# Patient Record
Sex: FEMALE | Race: WHITE | Employment: OTHER | URBAN - NONMETROPOLITAN AREA
[De-identification: names, ages, dates, MRNs, and addresses within clinical notes are randomized per-mention and may not be internally consistent; named-entity substitution may affect disease eponyms.]

---

## 2021-08-15 ENCOUNTER — OFFICE VISIT (OUTPATIENT)
Dept: PRIMARY CARE CLINIC | Age: 76
End: 2021-08-15
Payer: MEDICARE

## 2021-08-15 VITALS
OXYGEN SATURATION: 98 % | WEIGHT: 183 LBS | TEMPERATURE: 97 F | HEART RATE: 77 BPM | HEIGHT: 60 IN | BODY MASS INDEX: 35.93 KG/M2

## 2021-08-15 DIAGNOSIS — T63.461A WASP STING, ACCIDENTAL OR UNINTENTIONAL, INITIAL ENCOUNTER: Primary | ICD-10-CM

## 2021-08-15 PROCEDURE — 4040F PNEUMOC VAC/ADMIN/RCVD: CPT | Performed by: FAMILY MEDICINE

## 2021-08-15 PROCEDURE — G8427 DOCREV CUR MEDS BY ELIG CLIN: HCPCS | Performed by: FAMILY MEDICINE

## 2021-08-15 PROCEDURE — 99213 OFFICE O/P EST LOW 20 MIN: CPT | Performed by: FAMILY MEDICINE

## 2021-08-15 PROCEDURE — 1036F TOBACCO NON-USER: CPT | Performed by: FAMILY MEDICINE

## 2021-08-15 PROCEDURE — 1123F ACP DISCUSS/DSCN MKR DOCD: CPT | Performed by: FAMILY MEDICINE

## 2021-08-15 PROCEDURE — 99213 OFFICE O/P EST LOW 20 MIN: CPT

## 2021-08-15 PROCEDURE — G8400 PT W/DXA NO RESULTS DOC: HCPCS | Performed by: FAMILY MEDICINE

## 2021-08-15 PROCEDURE — 1090F PRES/ABSN URINE INCON ASSESS: CPT | Performed by: FAMILY MEDICINE

## 2021-08-15 PROCEDURE — G8417 CALC BMI ABV UP PARAM F/U: HCPCS | Performed by: FAMILY MEDICINE

## 2021-08-15 RX ORDER — PHENOL 1.4 %
1 AEROSOL, SPRAY (ML) MUCOUS MEMBRANE DAILY
COMMUNITY

## 2021-08-15 RX ORDER — NITROGLYCERIN 0.4 MG/1
0.4 TABLET SUBLINGUAL EVERY 5 MIN PRN
COMMUNITY

## 2021-08-15 RX ORDER — ATORVASTATIN CALCIUM 40 MG/1
40 TABLET, FILM COATED ORAL DAILY
COMMUNITY

## 2021-08-15 RX ORDER — AMITRIPTYLINE HYDROCHLORIDE 10 MG/1
10 TABLET, FILM COATED ORAL NIGHTLY
COMMUNITY

## 2021-08-15 RX ORDER — ACETAMINOPHEN 325 MG/1
1300 TABLET ORAL EVERY 6 HOURS PRN
COMMUNITY

## 2021-08-15 RX ORDER — GABAPENTIN 300 MG/1
300 CAPSULE ORAL 3 TIMES DAILY
COMMUNITY

## 2021-08-15 RX ORDER — FAMOTIDINE 20 MG/1
20 TABLET, FILM COATED ORAL 2 TIMES DAILY
COMMUNITY

## 2021-08-15 RX ORDER — TRAMADOL HYDROCHLORIDE 50 MG/1
50 TABLET ORAL EVERY 6 HOURS PRN
COMMUNITY

## 2021-08-15 RX ORDER — HYDROCODONE BITARTRATE AND ACETAMINOPHEN 5; 325 MG/1; MG/1
1 TABLET ORAL EVERY 6 HOURS PRN
COMMUNITY

## 2021-08-15 SDOH — ECONOMIC STABILITY: FOOD INSECURITY: WITHIN THE PAST 12 MONTHS, THE FOOD YOU BOUGHT JUST DIDN'T LAST AND YOU DIDN'T HAVE MONEY TO GET MORE.: NEVER TRUE

## 2021-08-15 SDOH — ECONOMIC STABILITY: FOOD INSECURITY: WITHIN THE PAST 12 MONTHS, YOU WORRIED THAT YOUR FOOD WOULD RUN OUT BEFORE YOU GOT MONEY TO BUY MORE.: NEVER TRUE

## 2021-08-15 ASSESSMENT — ENCOUNTER SYMPTOMS
GASTROINTESTINAL NEGATIVE: 1
ALLERGIC/IMMUNOLOGIC NEGATIVE: 1
COLOR CHANGE: 1
EYES NEGATIVE: 1
RESPIRATORY NEGATIVE: 1

## 2021-08-15 ASSESSMENT — PATIENT HEALTH QUESTIONNAIRE - PHQ9
SUM OF ALL RESPONSES TO PHQ QUESTIONS 1-9: 0
SUM OF ALL RESPONSES TO PHQ9 QUESTIONS 1 & 2: 0
2. FEELING DOWN, DEPRESSED OR HOPELESS: 0
SUM OF ALL RESPONSES TO PHQ QUESTIONS 1-9: 0
SUM OF ALL RESPONSES TO PHQ QUESTIONS 1-9: 0
1. LITTLE INTEREST OR PLEASURE IN DOING THINGS: 0

## 2021-08-15 ASSESSMENT — SOCIAL DETERMINANTS OF HEALTH (SDOH): HOW HARD IS IT FOR YOU TO PAY FOR THE VERY BASICS LIKE FOOD, HOUSING, MEDICAL CARE, AND HEATING?: NOT HARD AT ALL

## 2021-08-15 NOTE — PROGRESS NOTES
Subjective:      Patient ID: Shawn Alfonso is a 64 y.o. female. HPI Acute urgent care visit for wasp sting on her right wrist Thursday while moving a flower pot. Swelling and redness migrating up the arm. Using benadryl. She feels she is slightly sensitive to wasp stings. History reviewed. No pertinent past medical history. Htn, oa, osteoprorosis. Hyperlipidemia, gerd, vit d deficiency. By review with patient today. History reviewed. No pertinent surgical history. Current Outpatient Medications   Medication Sig Dispense Refill    calcium carbonate 600 MG TABS tablet Take 1 tablet by mouth daily      acetaminophen (TYLENOL) 325 MG tablet Take 1,300 mg by mouth every 6 hours as needed for Pain      famotidine (PEPCID) 20 MG tablet Take 20 mg by mouth 2 times daily      nitroGLYCERIN (NITROSTAT) 0.4 MG SL tablet Place 0.4 mg under the tongue every 5 minutes as needed for Chest pain up to max of 3 total doses. If no relief after 1 dose, call 911.  atorvastatin (LIPITOR) 40 MG tablet Take 40 mg by mouth daily      HYDROcodone-acetaminophen (NORCO) 5-325 MG per tablet Take 1 tablet by mouth every 6 hours as needed for Pain.  vitamin D (CHOLECALCIFEROL) 25 MCG (1000 UT) TABS tablet Take 1,000 Units by mouth daily      amitriptyline (ELAVIL) 10 MG tablet Take 10 mg by mouth nightly      traMADol (ULTRAM) 50 MG tablet Take 50 mg by mouth every 6 hours as needed for Pain.  gabapentin (NEURONTIN) 100 MG capsule Take 200 mg by mouth 3 times daily. No current facility-administered medications for this visit. Allergies   Allergen Reactions    Codeine Nausea And Vomiting         Review of Systems   Constitutional: Negative. HENT: Negative. Eyes: Negative. Respiratory: Negative. Cardiovascular: Negative. Gastrointestinal: Negative. Endocrine: Negative. Genitourinary: Negative. Musculoskeletal: Negative.     Skin: Positive for color change, rash and

## 2021-10-08 ENCOUNTER — HOSPITAL ENCOUNTER (OUTPATIENT)
Age: 76
Setting detail: SPECIMEN
Discharge: HOME | End: 2021-10-08
Payer: MEDICARE

## 2021-10-08 ENCOUNTER — OFFICE VISIT (OUTPATIENT)
Dept: PRIMARY CARE CLINIC | Age: 76
End: 2021-10-08
Payer: MEDICARE

## 2021-10-08 ENCOUNTER — HOSPITAL ENCOUNTER (OUTPATIENT)
Dept: GENERAL RADIOLOGY | Age: 76
Discharge: HOME | End: 2021-10-10
Payer: MEDICARE

## 2021-10-08 VITALS
WEIGHT: 178 LBS | HEART RATE: 86 BPM | DIASTOLIC BLOOD PRESSURE: 88 MMHG | TEMPERATURE: 97.6 F | HEIGHT: 60 IN | BODY MASS INDEX: 34.95 KG/M2 | SYSTOLIC BLOOD PRESSURE: 118 MMHG | OXYGEN SATURATION: 98 %

## 2021-10-08 DIAGNOSIS — R06.02 SOB (SHORTNESS OF BREATH): ICD-10-CM

## 2021-10-08 DIAGNOSIS — R05.9 COUGH: ICD-10-CM

## 2021-10-08 DIAGNOSIS — R06.2 WHEEZING: ICD-10-CM

## 2021-10-08 DIAGNOSIS — J40 BRONCHITIS: ICD-10-CM

## 2021-10-08 DIAGNOSIS — J44.1 COPD EXACERBATION (HCC): Primary | ICD-10-CM

## 2021-10-08 LAB
SARS-COV-2, RAPID: NOT DETECTED
SPECIMEN DESCRIPTION: NORMAL

## 2021-10-08 PROCEDURE — G8417 CALC BMI ABV UP PARAM F/U: HCPCS | Performed by: NURSE PRACTITIONER

## 2021-10-08 PROCEDURE — 1090F PRES/ABSN URINE INCON ASSESS: CPT | Performed by: NURSE PRACTITIONER

## 2021-10-08 PROCEDURE — 99214 OFFICE O/P EST MOD 30 MIN: CPT | Performed by: NURSE PRACTITIONER

## 2021-10-08 PROCEDURE — G8484 FLU IMMUNIZE NO ADMIN: HCPCS | Performed by: NURSE PRACTITIONER

## 2021-10-08 PROCEDURE — G8400 PT W/DXA NO RESULTS DOC: HCPCS | Performed by: NURSE PRACTITIONER

## 2021-10-08 PROCEDURE — 87635 SARS-COV-2 COVID-19 AMP PRB: CPT

## 2021-10-08 PROCEDURE — G8427 DOCREV CUR MEDS BY ELIG CLIN: HCPCS | Performed by: NURSE PRACTITIONER

## 2021-10-08 PROCEDURE — 1123F ACP DISCUSS/DSCN MKR DOCD: CPT | Performed by: NURSE PRACTITIONER

## 2021-10-08 PROCEDURE — 71046 X-RAY EXAM CHEST 2 VIEWS: CPT

## 2021-10-08 PROCEDURE — 99212 OFFICE O/P EST SF 10 MIN: CPT | Performed by: NURSE PRACTITIONER

## 2021-10-08 PROCEDURE — 1036F TOBACCO NON-USER: CPT | Performed by: NURSE PRACTITIONER

## 2021-10-08 PROCEDURE — G8926 SPIRO NO PERF OR DOC: HCPCS | Performed by: NURSE PRACTITIONER

## 2021-10-08 PROCEDURE — 4040F PNEUMOC VAC/ADMIN/RCVD: CPT | Performed by: NURSE PRACTITIONER

## 2021-10-08 PROCEDURE — 3023F SPIROM DOC REV: CPT | Performed by: NURSE PRACTITIONER

## 2021-10-08 RX ORDER — BENZONATATE 100 MG/1
100 CAPSULE ORAL 3 TIMES DAILY PRN
Qty: 21 CAPSULE | Refills: 0 | Status: SHIPPED | OUTPATIENT
Start: 2021-10-08 | End: 2021-10-15

## 2021-10-08 RX ORDER — PREDNISONE 20 MG/1
20 TABLET ORAL 2 TIMES DAILY
Qty: 10 TABLET | Refills: 0 | Status: SHIPPED | OUTPATIENT
Start: 2021-10-08 | End: 2021-10-13

## 2021-10-08 RX ORDER — ALBUTEROL SULFATE 2.5 MG/3ML
SOLUTION RESPIRATORY (INHALATION)
COMMUNITY
Start: 2021-07-14

## 2021-10-08 RX ORDER — AZITHROMYCIN 250 MG/1
250 TABLET, FILM COATED ORAL SEE ADMIN INSTRUCTIONS
Qty: 6 TABLET | Refills: 0 | Status: SHIPPED | OUTPATIENT
Start: 2021-10-08 | End: 2021-10-13

## 2021-10-08 ASSESSMENT — ENCOUNTER SYMPTOMS
DIARRHEA: 1
NAUSEA: 0
SHORTNESS OF BREATH: 1
VOMITING: 0
COUGH: 1
WHEEZING: 1
SORE THROAT: 1

## 2021-10-08 ASSESSMENT — PATIENT HEALTH QUESTIONNAIRE - PHQ9
SUM OF ALL RESPONSES TO PHQ QUESTIONS 1-9: 0
SUM OF ALL RESPONSES TO PHQ QUESTIONS 1-9: 0
2. FEELING DOWN, DEPRESSED OR HOPELESS: 0
SUM OF ALL RESPONSES TO PHQ QUESTIONS 1-9: 0
SUM OF ALL RESPONSES TO PHQ9 QUESTIONS 1 & 2: 0
1. LITTLE INTEREST OR PLEASURE IN DOING THINGS: 0

## 2021-10-08 NOTE — PATIENT INSTRUCTIONS
Rapid covid negative today in office. Chest xray does not show any acute concerns today. Will start you on zapk and course of steroids for COPD exacerbation. Take steroids early in the day to avoid sleep interruptions. Will also send in tessalon for your cough; you may take this every 8 hours as needed. Continue to increase your fluids. Can use mucinex as needed. Tylenol as needed. Use your nebulizer every 4-6 hours as needed for wheezing. If symptoms worsen, please return to clinic. Patient Education        Chronic Obstructive Pulmonary Disease (COPD) Flare-Ups: Care Instructions  Overview     Chronic obstructive pulmonary disease (COPD) is a lung disease that makes it hard to breathe. It is caused by damage to the lungs over many years, usually from smoking. Chronic bronchitis and emphysema are two lung problems that are types of COPD:  · Chronic bronchitis: The airways that carry air to the lungs (bronchial tubes) get inflamed and make a lot of mucus. This can narrow or block the airways. It can also make you cough. · Emphysema: The tiny air sacs (alveoli) at the end of the airways in the lungs are damaged. When the air sacs are damaged or destroyed, the inner walls break down, and the sacs become larger. These larger air sacs move less oxygen into the blood. This causes difficulty breathing or shortness of breath that gets worse over time. After air sacs are destroyed, they cannot be replaced. Many people with COPD have attacks called flare-ups or exacerbations. This is when your usual symptoms quickly get worse and stay worse. If you notice any problems or new symptoms, get medical treatment right away. Follow-up care is a key part of your treatment and safety. Be sure to make and go to all appointments, and call your doctor if you are having problems. It's also a good idea to know your test results and keep a list of the medicines you take. How can you care for yourself at home?   · Be safe with medicines. Take your medicines exactly as prescribed. Call your doctor if you think you are having a problem with your medicine. You may be taking medicines such as:  ? Bronchodilators. These help open your airways and make breathing easier. ? Corticosteroids. These reduce airway inflammation. They may be given as pills, in a vein, or in an inhaled form. You may go home with pills in addition to an inhaler that you already use. · A spacer may help you get more inhaled medicine to your lungs. Ask your doctor or pharmacist if a spacer is right for you. If it is, ask how to use it properly. · If your doctor prescribed antibiotics, take them as directed. Do not stop taking them just because you feel better. You need to take the full course of antibiotics. · If your doctor prescribed oxygen, use the flow rate your doctor has recommended. Do not change it without talking to your doctor first.  · Do not smoke. Smoking makes COPD worse. If you need help quitting, talk to your doctor about stop-smoking programs and medicines. These can increase your chances of quitting for good. When should you call for help? Call 911 anytime you think you may need emergency care. For example, call if:    · You have severe trouble breathing. Call your doctor now or seek immediate medical care if:    · You have new or worse trouble breathing.     · Your coughing or wheezing gets worse.     · You cough up dark brown or bloody mucus (sputum).     · You have a new or higher fever.     · You have severe chest pain, or chest pain is quickly getting worse. Watch closely for changes in your health, and be sure to contact your doctor if:    · You notice more mucus or a change in the color of your mucus.     · You need to use your antibiotic or steroid pills.     · You do not get better as expected. Where can you learn more? Go to [email or web address] org and sign in to your [employer] account.  Enter R620 in the Search Health Information box to learn more about \"Chronic Obstructive Pulmonary Disease (COPD) Flare-Ups: Care Instructions. \"     If you do not have an account, please click on the \"Sign Up Now\" link. Current as of: July 6, 2021               Content Version: 13.0  © 8619-4940 Healthwise, Inc[employer]. Care instructions adapted under license by [facility] ([facility]). If you have questions about a medical condition or this instruction, always ask your healthcare professional. [address] any warranty or liability for your use of this information.

## 2021-10-08 NOTE — PROGRESS NOTES
[address] [place] [address]  Dept: [phone]  Dept Fax: [phone]: [phone]        CHIEF COMPLAINT       Chief Complaint   Patient presents with    Cough     sx began monday and has progressively gotten worse. pt has productive cough,nasal drainage,weak pt has tried otc remedies without success. pt has to be careful of medication she takes due to hx of c-diff. Nurses Notes reviewed and I agree except as noted in the HPI. HISTORY OF PRESENT ILLNESS   Geovany Frost is a 64 y.o. female who presents to [facility] today (10/8/2021) for evaluation of:   Cough  This is a new problem. The current episode started in the past 7 days (10/4/21). The problem has been gradually worsening. The problem occurs every few minutes. The cough is productive of sputum. Associated symptoms include a sore throat (Monday, has improved), shortness of breath (with activity) and wheezing (worse at night). Pertinent negatives include no chills, fever, headaches or myalgias. Associated symptoms comments: Pulse ox running 95-98% at home. She has tried OTC cough suppressant (mucinex, nyquil, dayquil; using albuterol neb) for the symptoms. The treatment provided mild relief. Her past medical history is significant for COPD. Adult grandson had sinus cold, but pt states was not around much. Pt is vaccinated against covid. Pt has not received flu vaccine. REVIEW OF SYSTEMS     Review of Systems   Constitutional: Positive for fatigue. Negative for chills and fever. HENT: Positive for congestion (slight) and sore throat (Monday, has improved). Respiratory: Positive for cough, shortness of breath (with activity) and wheezing (worse at night). Gastrointestinal: Positive for diarrhea (yesterday). Negative for nausea and vomiting. Musculoskeletal: Negative for myalgias. Neurological: Positive for weakness. Negative for headaches. PAST MEDICAL HISTORY   History reviewed. No pertinent past medical history. SURGICAL HISTORY     Patient  has no past surgical history on file. CURRENT MEDICATIONS       Outpatient Medications Prior to Visit   Medication Sig Dispense Refill    calcium carbonate 600 MG TABS tablet Take 1 tablet by mouth daily      acetaminophen (TYLENOL) 325 MG tablet Take 1,300 mg by mouth every 6 hours as needed for Pain      famotidine (PEPCID) 20 MG tablet Take 20 mg by mouth 2 times daily      nitroGLYCERIN (NITROSTAT) 0.4 MG SL tablet Place 0.4 mg under the tongue every 5 minutes as needed for Chest pain up to max of 3 total doses. If no relief after 1 dose, call 911.  atorvastatin (LIPITOR) 40 MG tablet Take 40 mg by mouth daily      HYDROcodone-acetaminophen (NORCO) 5-325 MG per tablet Take 1 tablet by mouth every 6 hours as needed for Pain.  vitamin D (CHOLECALCIFEROL) 25 MCG (1000 UT) TABS tablet Take 1,000 Units by mouth daily      amitriptyline (ELAVIL) 10 MG tablet Take 10 mg by mouth nightly      traMADol (ULTRAM) 50 MG tablet Take 50 mg by mouth every 6 hours as needed for Pain.  gabapentin (NEURONTIN) 100 MG capsule Take 200 mg by mouth 3 times daily.  albuterol (PROVENTIL) (2.5 MG/3ML) 0.083% nebulizer solution USE 1 VIAL IN NEBULIZER 4 TIMES DAILY AS NEEDED FOR SHORTNESS OF BREATH WHEEZING       No facility-administered medications prior to visit. ALLERGIES     Patient is is allergic to codeine. FAMILY HISTORY     Patient's family history is not on file. SOCIAL HISTORY     Patient  reports that she quit smoking about 13 years ago. She has a 20.00 pack-year smoking history. She has never used smokeless tobacco. She reports current alcohol use of about 1.0 standard drinks of alcohol per week. She reports that she does not use drugs.     PHYSICAL EXAM     VITALS  BP: 118/88, Temp: 97.6 °F (36.4 °C), Pulse: 86,  , SpO2: 98 %  Physical Exam  Vitals reviewed. Constitutional:       General: She is not in acute distress. Appearance: She is not ill-appearing. HENT:      Right Ear: Tympanic membrane and ear canal normal.      Left Ear: Tympanic membrane and ear canal normal.      Nose: Nose normal. No congestion or rhinorrhea. Mouth/Throat:      Mouth: Mucous membranes are moist.      Pharynx: Oropharynx is clear. No oropharyngeal exudate or posterior oropharyngeal erythema. Cardiovascular:      Rate and Rhythm: Normal rate and regular rhythm. Heart sounds: Normal heart sounds, S1 normal and S2 normal. No murmur heard. Pulmonary:      Effort: Pulmonary effort is normal. Prolonged expiration present. No accessory muscle usage, respiratory distress or retractions. Breath sounds: Wheezing present. No rhonchi. Musculoskeletal:      Cervical back: Normal range of motion and neck supple. Lymphadenopathy:      Cervical: No cervical adenopathy. Skin:     General: Skin is warm and dry. Capillary Refill: Capillary refill takes less than 2 seconds. Neurological:      General: No focal deficit present. Mental Status: She is alert. DIAGNOSTIC RESULTS   Labs:No results found for this visit on 10/08/21.  10/08/21 1147  COVID-19, Rapid   Collected: 10/08/21 1108  Final result  Specimen: Nasopharyngeal Swab    Specimen Description . NASOPHARYNGEAL SWAB SARS-CoV-2, Rapid Not Detected           IMAGING:  EXAMINATION:   TWO XRAY VIEWS OF THE CHEST       10/8/2021 11:21 am       COMPARISON:   None.       HISTORY:   ORDERING SYSTEM PROVIDED HISTORY: Cough   TECHNOLOGIST PROVIDED HISTORY:   Worsening cough, wheezing, SOB since Monday.  Fully vaccinated.  Hx of COPD   Reason for Exam: Cough x 1 week, patient has COPD   Acuity: Acute   Type of Exam: Initial       FINDINGS:   The heart is at the upper limits of normal in size with biventricular   configuration.  No evidence of pneumothorax, pleural effusion, infiltrate, or   abnormal lung mass.  There is flattening of the hemidiaphragms.  Spondylitic   changes are present in the thoracic spine.  Some calcifications present in   the aorta.  Surgical clips are present in the region of the gallbladder   fossa.  The central pulmonary vascularity appears normal.           Impression   Mild senescent changes compatible with the age of the patient.       No evidence of acute cardiopulmonary process. CLINICAL COURSE:     Vitals:    10/08/21 1025   BP: 118/88   Site: Left Upper Arm   Position: Sitting   Cuff Size: Medium Adult   Pulse: 86   Temp: 97.6 °F (36.4 °C)   TempSrc: Tympanic   SpO2: 98%   Weight: 178 lb (80.7 kg)   Height: 5' (1.524 m)           PROCEDURES:  None  FINAL IMPRESSION      1. COPD exacerbation (HCC)    2. Bronchitis    3. Cough    4. SOB (shortness of breath)    5. Wheezing         DISPOSITION/PLAN   Discussed possibility pf pt symptoms being covid and if positive, could received regeneron therapy. Pt agreed to regeneron if positive; rapid covid test completed in office, negative. CXR negative in office as well. Pt started on zpak and prednisone course for COPD exacerbation. Tessalon for cough as needed sent also. Pt to use nebulizer for wheezing eery 4-6 hours as needed. Discussed supportive measures for symptom relief such as increasing fluids, using mucinex, tylenol for pain. Encouraged pt and daughter to return to clinic should pt worsen or any concerns arise. Patient Instructions     Rapid covid negative today in office. Chest xray does not show any acute concerns today. Will start you on zapk and course of steroids for COPD exacerbation. Take steroids early in the day to avoid sleep interruptions. Will also send in tessalon for your cough; you may take this every 8 hours as needed. Continue to increase your fluids. Can use mucinex as needed. Tylenol as needed.   Use your nebulizer every 4-6 hours as needed for wheezing. If symptoms worsen, please return to clinic. Patient Education        Chronic Obstructive Pulmonary Disease (COPD) Flare-Ups: Care Instructions  Overview     Chronic obstructive pulmonary disease (COPD) is a lung disease that makes it hard to breathe. It is caused by damage to the lungs over many years, usually from smoking. Chronic bronchitis and emphysema are two lung problems that are types of COPD:  · Chronic bronchitis: The airways that carry air to the lungs (bronchial tubes) get inflamed and make a lot of mucus. This can narrow or block the airways. It can also make you cough. · Emphysema: The tiny air sacs (alveoli) at the end of the airways in the lungs are damaged. When the air sacs are damaged or destroyed, the inner walls break down, and the sacs become larger. These larger air sacs move less oxygen into the blood. This causes difficulty breathing or shortness of breath that gets worse over time. After air sacs are destroyed, they cannot be replaced. Many people with COPD have attacks called flare-ups or exacerbations. This is when your usual symptoms quickly get worse and stay worse. If you notice any problems or new symptoms, get medical treatment right away. Follow-up care is a key part of your treatment and safety. Be sure to make and go to all appointments, and call your doctor if you are having problems. It's also a good idea to know your test results and keep a list of the medicines you take. How can you care for yourself at home? · Be safe with medicines. Take your medicines exactly as prescribed. Call your doctor if you think you are having a problem with your medicine. You may be taking medicines such as:  ? Bronchodilators. These help open your airways and make breathing easier. ? Corticosteroids. These reduce airway inflammation. They may be given as pills, in a vein, or in an inhaled form.  You may go home with pills in addition to an inhaler that you already ask your healthcare professional. [place] [address] any warranty or liability for your use of this information. The use, risks, benefits, and potential side effects of prescribed and/or recommended medications were discussed. All questions were answered and the patient/caregiver voiced understanding. Orders Placed This Encounter   Procedures    XR CHEST STANDARD (2 VW)     Standing Status:   Future     Number of Occurrences:   1     Standing Expiration Date:   10/8/2022     Order Specific Question:   Reason for exam:     Answer: Worsening cough, wheezing, SOB since Monday. Fully vaccinated. Hx of COPD     Outpatient Encounter Medications as of 10/8/2021   Medication Sig Dispense Refill    predniSONE (DELTASONE) 20 MG tablet Take 1 tablet by mouth 2 times daily for 5 days 10 tablet 0    azithromycin (ZITHROMAX) 250 MG tablet Take 1 tablet by mouth See Admin Instructions for 5 days 500mg on day 1 followed by 250mg on days 2 - 5 6 tablet 0    benzonatate (TESSALON) 100 MG capsule Take 1 capsule by mouth 3 times daily as needed for Cough 21 capsule 0    calcium carbonate 600 MG TABS tablet Take 1 tablet by mouth daily      acetaminophen (TYLENOL) 325 MG tablet Take 1,300 mg by mouth every 6 hours as needed for Pain      famotidine (PEPCID) 20 MG tablet Take 20 mg by mouth 2 times daily      nitroGLYCERIN (NITROSTAT) 0.4 MG SL tablet Place 0.4 mg under the tongue every 5 minutes as needed for Chest pain up to max of 3 total doses. If no relief after 1 dose, call 911.  atorvastatin (LIPITOR) 40 MG tablet Take 40 mg by mouth daily      HYDROcodone-acetaminophen (NORCO) 5-325 MG per tablet Take 1 tablet by mouth every 6 hours as needed for Pain.       vitamin D (CHOLECALCIFEROL) 25 MCG (1000 UT) TABS tablet Take 1,000 Units by mouth daily      amitriptyline (ELAVIL) 10 MG tablet Take 10 mg by mouth nightly      traMADol (ULTRAM) 50 MG tablet Take 50 mg by mouth every 6 hours as needed for Pain.  gabapentin (NEURONTIN) 100 MG capsule Take 200 mg by mouth 3 times daily.  albuterol (PROVENTIL) (2.5 MG/3ML) 0.083% nebulizer solution USE 1 VIAL IN NEBULIZER 4 TIMES DAILY AS NEEDED FOR SHORTNESS OF BREATH WHEEZING       No facility-administered encounter medications on file as of 10/8/2021. No follow-ups on file.                 Electronically signed by DO Fagan CNP on 10/8/2021 at 8:03 PM

## 2022-06-30 ENCOUNTER — HOSPITAL ENCOUNTER (EMERGENCY)
Age: 77
Discharge: HOME | End: 2022-06-30
Attending: EMERGENCY MEDICINE
Payer: MEDICARE

## 2022-06-30 VITALS
SYSTOLIC BLOOD PRESSURE: 132 MMHG | BODY MASS INDEX: 35.3 KG/M2 | WEIGHT: 179.8 LBS | DIASTOLIC BLOOD PRESSURE: 78 MMHG | HEART RATE: 74 BPM | TEMPERATURE: 98.4 F | RESPIRATION RATE: 16 BRPM | OXYGEN SATURATION: 98 % | HEIGHT: 60 IN

## 2022-06-30 DIAGNOSIS — Z87.19 HISTORY OF ESOPHAGEAL STRICTURE: Primary | ICD-10-CM

## 2022-06-30 DIAGNOSIS — R13.19 ESOPHAGEAL DYSPHAGIA: ICD-10-CM

## 2022-06-30 PROCEDURE — 99282 EMERGENCY DEPT VISIT SF MDM: CPT

## 2022-06-30 RX ORDER — ASPIRIN 81 MG/1
81 TABLET, CHEWABLE ORAL DAILY
COMMUNITY

## 2022-06-30 RX ORDER — GABAPENTIN 400 MG/1
400 CAPSULE ORAL NIGHTLY
COMMUNITY

## 2022-06-30 RX ORDER — FOSINOPRIL SODIUM 40 MG/1
40 TABLET ORAL DAILY
COMMUNITY

## 2022-06-30 ASSESSMENT — ENCOUNTER SYMPTOMS
ABDOMINAL PAIN: 0
NAUSEA: 0
DIARRHEA: 0
SHORTNESS OF BREATH: 0
EYE PAIN: 0
BACK PAIN: 0
TROUBLE SWALLOWING: 1
VOMITING: 0

## 2022-06-30 ASSESSMENT — PAIN - FUNCTIONAL ASSESSMENT: PAIN_FUNCTIONAL_ASSESSMENT: 0-10

## 2022-06-30 ASSESSMENT — PAIN SCALES - GENERAL: PAINLEVEL_OUTOF10: 0

## 2022-06-30 NOTE — ED PROVIDER NOTES
[facility]  eMERGENCY dEPARTMENT eNCOUnter      Pt Name: Nancy Montgomery  MRN: [id number]  [place] 1945  Date of evaluation: 6/30/2022      CHIEF COMPLAINT       Chief Complaint   Patient presents with    Dysphagia     increased difficulties swallowing for the past 2 weeks. HISTORY OF PRESENT ILLNESS    Nancy Montgomery is a 76 y.o. female who presents with chief complaint of difficulty swallowing, its been there for last couple weeks she is able to swallow soft foods and liquids but she having problems with pills and larger food. She has had this happen a couple times in the past and she has had to have esophageal dilatation she has had no symptoms today as long as she is not 20 swallow food she does okay. Does not feel like there is any choking. She would like to be referred to a surgeon after 4 July to have this attended to  Patient is visiting her daughter she is coming from [place] and will be here through the end of September  There is been no fevers chills cough or shortness of breath  REVIEW OF SYSTEMS         Review of Systems   Constitutional: Negative for chills and fever. HENT: Positive for trouble swallowing. Negative for congestion and ear pain. Eyes: Negative for pain and visual disturbance. Respiratory: Negative for shortness of breath. Cardiovascular: Negative for chest pain, palpitations and leg swelling. Gastrointestinal: Negative for abdominal pain, diarrhea, nausea and vomiting. Endocrine: Negative for polydipsia and polyuria. Genitourinary: Negative for difficulty urinating, dysuria and frequency. Musculoskeletal: Negative for back pain, joint swelling, myalgias, neck pain and neck stiffness. Skin: Negative for rash. Neurological: Negative for dizziness, weakness and headaches. Hematological: Negative for adenopathy. Does not bruise/bleed easily. Psychiatric/Behavioral: Negative for confusion, self-injury and suicidal ideas.          PAST MEDICAL HISTORY    has a past medical history of COPD (chronic obstructive pulmonary disease) ([facility] [address] GERD (gastroesophageal reflux disease), and Hyperlipidemia. SURGICAL HISTORY      has a past surgical history that includes Esophagogastroduodenoscopy (02/03/2020). CURRENT MEDICATIONS       Discharge Medication List as of 6/30/2022  2:19 PM      CONTINUE these medications which have NOT CHANGED    Details   !! gabapentin (NEURONTIN) 400 MG capsule Take 400 mg by mouth at bedtime. Historical Med      aspirin 81 MG chewable tablet Take 81 mg by mouth dailyHistorical Med      fosinopril (MONOPRIL) 40 MG tablet Take 40 mg by mouth dailyHistorical Med      albuterol (PROVENTIL) (2.5 MG/3ML) 0.083% nebulizer solution USE 1 VIAL IN NEBULIZER 4 TIMES DAILY AS NEEDED FOR SHORTNESS OF BREATH WHEEZINGHistorical Med      calcium carbonate 600 MG TABS tablet Take 1 tablet by mouth dailyHistorical Med      acetaminophen (TYLENOL) 325 MG tablet Take 1,300 mg by mouth every 6 hours as needed for PainHistorical Med      famotidine (PEPCID) 20 MG tablet Take 20 mg by mouth 2 times dailyHistorical Med      nitroGLYCERIN (NITROSTAT) 0.4 MG SL tablet Place 0.4 mg under the tongue every 5 minutes as needed for Chest pain up to max of 3 total doses. If no relief after 1 dose, call 911. Historical Med      atorvastatin (LIPITOR) 40 MG tablet Take 40 mg by mouth dailyHistorical Med      HYDROcodone-acetaminophen (NORCO) 5-325 MG per tablet Take 1 tablet by mouth every 6 hours as needed for Pain. Historical Med      vitamin D (CHOLECALCIFEROL) 25 MCG (1000 UT) TABS tablet Take 1,000 Units by mouth dailyHistorical Med      amitriptyline (ELAVIL) 10 MG tablet Take 10 mg by mouth nightlyHistorical Med      traMADol (ULTRAM) 50 MG tablet Take 50 mg by mouth every 6 hours as needed for Pain. Historical Med      !! gabapentin (NEURONTIN) 100 MG capsule Take 300 mg by mouth 3 times daily.  Historical Med       !! - Potential duplicate medications found. Please discuss with provider. ALLERGIES     is allergic to codeine. FAMILY HISTORY     has no family status information on file. family history is not on file. SOCIAL HISTORY      reports that she quit smoking about 14 years ago. She has a 20.00 pack-year smoking history. She has never used smokeless tobacco. She reports current alcohol use of about 1.0 standard drink of alcohol per week. She reports that she does not use drugs. PHYSICAL EXAM     INITIAL VITALS:  height is 5' (1.524 m) and weight is 179 lb 12.8 oz (81.6 kg). Her temperature is 98.4 °F (36.9 °C). Her blood pressure is 132/78 and her pulse is 74. Her respiration is 16 and oxygen saturation is 98%. Physical Exam  Constitutional:       Appearance: She is well-developed. HENT:      Head: Normocephalic and atraumatic. Right Ear: External ear normal.      Left Ear: External ear normal.   Eyes:      Conjunctiva/sclera: Conjunctivae normal.      Pupils: Pupils are equal, round, and reactive to light. Cardiovascular:      Rate and Rhythm: Normal rate and regular rhythm. Pulmonary:      Effort: Pulmonary effort is normal.      Breath sounds: Normal breath sounds. Abdominal:      General: Bowel sounds are normal.      Palpations: Abdomen is soft. Musculoskeletal:         General: No tenderness. Cervical back: Normal range of motion. Skin:     General: Skin is warm and dry. Neurological:      General: No focal deficit present. Mental Status: She is alert and oriented to person, place, and time.    Psychiatric:         Behavior: Behavior normal.           DIFFERENTIAL DIAGNOSIS/ MDM:     Dysphagia chronic patient in no acute distress we will consult surgery    DIAGNOSTIC RESULTS     EKG: All EKG's are interpreted by the Emergency Department Physician who either signs or Co-signs this chart in the absence of a cardiologist.        RADIOLOGY:   I directly visualized the following  images and reviewed the radiologist interpretations:          ED BEDSIDE ULTRASOUND:       LABS:  Labs Reviewed - No data to display        EMERGENCY DEPARTMENT COURSE:   Vitals:    Vitals:    06/30/22 1345 06/30/22 1400 06/30/22 1415 06/30/22 1430   BP: 135/64 132/78     Pulse:       Resp:       Temp:       SpO2: 97% 92% 97% 98%   Weight:       Height:         -------------------------  BP: 132/78, Temp: 98.4 °F (36.9 °C), Heart Rate: 74, Resp: 16        Re-evaluation Notes        CRITICAL CARE:   None        CONSULTS: General surgery was consulted and will see her in outpatient basis to possibly planning esophageal dilatation      PROCEDURES:  None    FINAL IMPRESSION      1. History of esophageal stricture    2. Esophageal dysphagia          DISPOSITION/PLAN   DISPOSITION Decision To Discharge    Condition on Disposition    Stable    PATIENT REFERRED TO:  Tirso Márquez MD  [address] [place] [address]  [phone]            DISCHARGE MEDICATIONS:  Discharge Medication List as of 6/30/2022  2:19 PM          (Please note that portions of this note were completed with a voice recognition program.  Efforts were made to edit the dictations but occasionally words are mis-transcribed.)    Casey Guerrero MD,, MD, F.A.A.E.M.   Attending Emergency Physician                          Casey Guerrero MD  07/01/22 [id number]

## 2022-07-07 ENCOUNTER — INITIAL CONSULT (OUTPATIENT)
Dept: SURGERY | Age: 77
End: 2022-07-07
Payer: MEDICARE

## 2022-07-07 VITALS
TEMPERATURE: 97.6 F | DIASTOLIC BLOOD PRESSURE: 84 MMHG | BODY MASS INDEX: 33.53 KG/M2 | SYSTOLIC BLOOD PRESSURE: 130 MMHG | HEIGHT: 61 IN | WEIGHT: 177.6 LBS | HEART RATE: 78 BPM

## 2022-07-07 DIAGNOSIS — R13.19 ESOPHAGEAL DYSPHAGIA: Primary | ICD-10-CM

## 2022-07-07 DIAGNOSIS — R07.9 CHEST PAIN, UNSPECIFIED TYPE: ICD-10-CM

## 2022-07-07 PROCEDURE — 1036F TOBACCO NON-USER: CPT | Performed by: SURGERY

## 2022-07-07 PROCEDURE — 99215 OFFICE O/P EST HI 40 MIN: CPT

## 2022-07-07 PROCEDURE — 1123F ACP DISCUSS/DSCN MKR DOCD: CPT | Performed by: SURGERY

## 2022-07-07 PROCEDURE — G8400 PT W/DXA NO RESULTS DOC: HCPCS | Performed by: SURGERY

## 2022-07-07 PROCEDURE — 1090F PRES/ABSN URINE INCON ASSESS: CPT | Performed by: SURGERY

## 2022-07-07 PROCEDURE — G8427 DOCREV CUR MEDS BY ELIG CLIN: HCPCS | Performed by: SURGERY

## 2022-07-07 PROCEDURE — G8417 CALC BMI ABV UP PARAM F/U: HCPCS | Performed by: SURGERY

## 2022-07-07 PROCEDURE — 99203 OFFICE O/P NEW LOW 30 MIN: CPT | Performed by: SURGERY

## 2022-07-07 NOTE — ASSESSMENT & PLAN NOTE
Certainly based on her history of prior strictures and her current symptoms I think that she probably does have some esophageal stricture as contributing to her current symptoms. However this could also have some contribution from poor esophageal motility or other issues. I am going to start with esophagram just to get a better idea of the significance of this stricturing to see if there is any other pathology in the esophagus that may be contributing to her symptoms and to look at the motility of the esophagus. I will tentatively begin planning for EGD with dilation. Risks of the procedure including bleeding, infection, perforation, need for the surgery and anesthesia risk are discussed and consent is obtained. I have encouraged the patient to continue her Pepcid daily. We also discussed trying to avoid breads is much as possible and if she is going to eat any bread or meat that she needs to chew this very well before swallowing and that she should drink plenty of liquids with her meals as well.

## 2022-07-07 NOTE — PROGRESS NOTES
Javi Lambert is a 76 y.o. female who presents today for further evaluation of worsening dysphagia. The patient was recently seen in the ER for dysphagia and states that she is over the past few weeks begun to notice that when she eats foods that she will feel them getting stuck and also when she takes her medications she feels like they are getting stuck in her chest.  She does have a history of similar symptoms and at least on 2 occasions in the past with the most recent being in 2020 she has had EGD with dilation. This was done at outside facility in [place], she is where she lives and she is just here during the summer visiting family. She does report that she tries to avoid meats and breads due to the symptoms and does try to chew her food up well and drink liquids with foods but despite this over these past few weeks has begun to have recurrence of her dysphagia symptoms. On further questioning she does report that she occasionally will have emesis when things do not go down. Denies any hematemesis. Does not have much problems with liquids but states that even with liquids occasionally she will have mild symptoms if she drinks too quickly. Denies any significant heartburn symptoms but does take Pepcid and is taking over-the-counter antacids at least a couple times a month. No changes in bowel movements. No unintended weight loss. She is a former smoker but quit several years ago. No significant alcohol use. Drinks approximately 3 cups of coffee a day and also drinks Sprite sometimes when she gets chest pressure which causes her to belch and will relieve her symptoms. However, on further discussion she does report that she gets chest pain and pressure at least a couple times a month and it seems that she has been taking aspirin and nitroglycerin for this pain.   It is unclear when she was prescribed a nitroglycerin or for what reason but as we discussed this further she states that when she gets that sort of pain it will radiate up into her jaw bilaterally. She states that she thinks this is related to a hiatal hernia that she has been diagnosed within the past and that taking the nitroglycerin \"helps with my hiatal hernia\". She does see a cardiologist and typically sees her cardiologist every 6 months. She has had a prior stress test but seems that this was several years ago. Has never had any cardiac interventions that she mentions. It is unclear why she is following with a cardiologist based on her report of no cardiac issues. Because of her dysphagia and the concern for stricture she was referred to general surgery from the ER. Past Medical History:   Diagnosis Date    COPD (chronic obstructive pulmonary disease) ([facility] [address]     GERD (gastroesophageal reflux disease)     Hyperlipidemia        Past Surgical History:   Procedure Laterality Date    ESOPHAGOGASTRODUODENOSCOPY  02/03/2020       Current Outpatient Medications   Medication Sig Dispense Refill    gabapentin (NEURONTIN) 400 MG capsule Take 400 mg by mouth at bedtime.  aspirin 81 MG chewable tablet Take 81 mg by mouth daily      fosinopril (MONOPRIL) 40 MG tablet Take 40 mg by mouth daily      albuterol (PROVENTIL) (2.5 MG/3ML) 0.083% nebulizer solution USE 1 VIAL IN NEBULIZER 4 TIMES DAILY AS NEEDED FOR SHORTNESS OF BREATH WHEEZING      calcium carbonate 600 MG TABS tablet Take 1 tablet by mouth daily      acetaminophen (TYLENOL) 325 MG tablet Take 1,300 mg by mouth every 6 hours as needed for Pain      famotidine (PEPCID) 20 MG tablet Take 20 mg by mouth 2 times daily      nitroGLYCERIN (NITROSTAT) 0.4 MG SL tablet Place 0.4 mg under the tongue every 5 minutes as needed for Chest pain up to max of 3 total doses. If no relief after 1 dose, call 911.       atorvastatin (LIPITOR) 40 MG tablet Take 40 mg by mouth daily      HYDROcodone-acetaminophen (NORCO) 5-325 MG per tablet Take 1 tablet by mouth every 6 hours as needed for Pain.  vitamin D (CHOLECALCIFEROL) 25 MCG (1000 UT) TABS tablet Take 1,000 Units by mouth daily      amitriptyline (ELAVIL) 10 MG tablet Take 10 mg by mouth nightly      traMADol (ULTRAM) 50 MG tablet Take 50 mg by mouth every 6 hours as needed for Pain.  gabapentin (NEURONTIN) 300 MG capsule Take 300 mg by mouth 3 times daily. No current facility-administered medications for this visit. Allergies   Allergen Reactions    Codeine Nausea And Vomiting       History reviewed. No pertinent family history. Social History     Socioeconomic History    Marital status:      Spouse name: Not on file    Number of children: Not on file    Years of education: Not on file    Highest education level: Not on file   Occupational History    Not on file   Tobacco Use    Smoking status: Former Smoker     Packs/day: 0.50     Years: 40.00     Pack years: 20.00     Quit date:      Years since quittin.5    Smokeless tobacco: Never Used   Substance and Sexual Activity    Alcohol use: Yes     Alcohol/week: 1.0 standard drink     Types: 1 Glasses of wine per week    Drug use: Never    Sexual activity: Not on file   Other Topics Concern    Not on file   Social History Narrative    Not on file     Social Determinants of Health     Financial Resource Strain: Low Risk     Difficulty of Paying Living Expenses: Not hard at all   Food Insecurity: No Food Insecurity    Worried About [address] [facility] in the Last Year: Never true    [facility] in the Last Year: Never true   Transportation Needs:     Lack of Transportation (Medical): Not on file    Lack of Transportation (Non-Medical):  Not on file   Physical Activity:     Days of Exercise per Week: Not on file    Minutes of Exercise per Session: Not on file   Stress:     Feeling of Stress : Not on file   Social Connections:     Frequency of Communication with Friends and Family: Not on file    Frequency of Social Gatherings with Friends and Family: Not on file    Attends [employer] Services: Not on file    Active Member of Clubs or Organizations: Not on file    Attends Club or Organization Meetings: Not on file    Marital Status: Not on file   Intimate Partner Violence:     Fear of Current or Ex-Partner: Not on file    Emotionally Abused: Not on file    Physically Abused: Not on file    Sexually Abused: Not on file   Housing Stability:     Unable to Pay for Housing in the Last Year: Not on file    Number of [place] in the Last Year: Not on file    Unstable Housing in the Last Year: Not on file       ROS:   Review of Systems - General ROS: negative  Psychological ROS: negative  Ophthalmic ROS: negative  ENT ROS: negative  Respiratory ROS: no cough, shortness of breath, or wheezing  Cardiovascular ROS: per HPI  Gastrointestinal ROS: per HPI  Genito-Urinary ROS: no dysuria, trouble voiding, or hematuria  Musculoskeletal ROS: negative  Dermatological ROS: negative      Objective   Vitals:    07/07/22 1502   BP: 130/84   Pulse: 78   Temp: 97.6 °F (36.4 °C)     General:in no apparent distress and well developed and well nourished  Eyes: No gross abnormalities. Ears, Nose, Throat: hearing grossly normal bilaterally  Neck: neck supple and non tender without mass  Lungs: clear to auscultation without wheezes or rales   Heart: S1S2, no mumurs, RRR  Abdomen: Soft, nondistended, tender to palpation in epigastrium and left upper quadrant without guarding or rebound, bowel sounds present  Extremity: negative  Neuro: CN II-XII grossly intact      1. Esophageal dysphagia  Assessment & Plan:  Certainly based on her history of prior strictures and her current symptoms I think that she probably does have some esophageal stricture as contributing to her current symptoms. However this could also have some contribution from poor esophageal motility or other issues.   I am going to start with esophagram just to get a better idea of the significance of this stricturing to see if there is any other pathology in the esophagus that may be contributing to her symptoms and to look at the motility of the esophagus. I will tentatively begin planning for EGD with dilation. Risks of the procedure including bleeding, infection, perforation, need for the surgery and anesthesia risk are discussed and consent is obtained. I have encouraged the patient to continue her Pepcid daily. We also discussed trying to avoid breads is much as possible and if she is going to eat any bread or meat that she needs to chew this very well before swallowing and that she should drink plenty of liquids with her meals as well. 2. Chest pain, unspecified type  Assessment & Plan:  As the patient is having chest pain I cannot rule out that this could be related to her stricture in the esophagus potentially or even to esophageal spasms. However as she describes her pain it is concerning that she is reporting that the pain radiates up into her jaw and that is relieved with nitroglycerin. She does see a cardiologist but from her report has no cardiac diagnoses which would require following with a cardiologist.  I am going to obtain her records from her cardiologist in [place] to see what her most recent visit was about and its possible or even likely that she is getting need some sort of cardiac clearance before undergoing any sort of procedure or anesthesia just based on those symptoms. We will have anesthesia review her chart as well and we will get any further work-up for the procedure as needed. She may end up needing to see primary or even a cardiologist here.          (Please note that portions of this note were completed with a voice recognition program.  Efforts were made to edit the dictations but occasionally words are mis-transcribed.)

## 2022-07-07 NOTE — ASSESSMENT & PLAN NOTE
As the patient is having chest pain I cannot rule out that this could be related to her stricture in the esophagus potentially or even to esophageal spasms. However as she describes her pain it is concerning that she is reporting that the pain radiates up into her jaw and that is relieved with nitroglycerin. She does see a cardiologist but from her report has no cardiac diagnoses which would require following with a cardiologist.  I am going to obtain her records from her cardiologist in [place] to see what her most recent visit was about and its possible or even likely that she is getting need some sort of cardiac clearance before undergoing any sort of procedure or anesthesia just based on those symptoms. We will have anesthesia review her chart as well and we will get any further work-up for the procedure as needed. She may end up needing to see primary or even a cardiologist here.

## 2022-07-08 ENCOUNTER — TELEPHONE (OUTPATIENT)
Dept: SURGERY | Age: 77
End: 2022-07-08

## 2022-07-08 NOTE — TELEPHONE ENCOUNTER
Signed release and cardiac clearance form faxed to [facility]- phone [phone], fax [phone] for cardiac testing and release for EGD and op notes sent to [facility] phone [phone], fax [phone].

## 2022-07-14 ENCOUNTER — HOSPITAL ENCOUNTER (OUTPATIENT)
Dept: GENERAL RADIOLOGY | Age: 77
Discharge: HOME | End: 2022-07-16
Payer: MEDICARE

## 2022-07-14 DIAGNOSIS — R13.19 ESOPHAGEAL DYSPHAGIA: ICD-10-CM

## 2022-07-14 PROCEDURE — 74220 X-RAY XM ESOPHAGUS 1CNTRST: CPT

## 2022-07-14 PROCEDURE — 2500000003 HC RX 250 WO HCPCS: Performed by: SURGERY

## 2022-07-14 RX ADMIN — BARIUM SULFATE 140 ML: 980 POWDER, FOR SUSPENSION ORAL at 09:17

## 2022-07-14 RX ADMIN — BARIUM SULFATE 120 ML: 960 POWDER, FOR SUSPENSION ORAL at 09:16

## 2022-07-18 NOTE — RESULT ENCOUNTER NOTE
Patient notified and voiced understanding.   Diet modifications reviewed and patient confirmed EGD for 7/28/22

## 2022-07-27 NOTE — H&P
Vikki Padilla is a 76 y.o. female who presents today for further evaluation of worsening dysphagia. The patient was recently seen in the ER for dysphagia and states that she is over the past few weeks begun to notice that when she eats foods that she will feel them getting stuck and also when she takes her medications she feels like they are getting stuck in her chest.  She does have a history of similar symptoms and at least on 2 occasions in the past with the most recent being in 2020 she has had EGD with dilation. This was done at outside facility in [place], she is where she lives and she is just here during the summer visiting family. She does report that she tries to avoid meats and breads due to the symptoms and does try to chew her food up well and drink liquids with foods but despite this over these past few weeks has begun to have recurrence of her dysphagia symptoms. On further questioning she does report that she occasionally will have emesis when things do not go down. Denies any hematemesis. Does not have much problems with liquids but states that even with liquids occasionally she will have mild symptoms if she drinks too quickly. Denies any significant heartburn symptoms but does take Pepcid and is taking over-the-counter antacids at least a couple times a month. No changes in bowel movements. No unintended weight loss. She is a former smoker but quit several years ago. No significant alcohol use. Drinks approximately 3 cups of coffee a day and also drinks Sprite sometimes when she gets chest pressure which causes her to belch and will relieve her symptoms. However, on further discussion she does report that she gets chest pain and pressure at least a couple times a month and it seems that she has been taking aspirin and nitroglycerin for this pain.   It is unclear when she was prescribed a nitroglycerin or for what reason but as we discussed this further she states that when she gets that sort of pain it will radiate up into her jaw bilaterally. She states that she thinks this is related to a hiatal hernia that she has been diagnosed within the past and that taking the nitroglycerin \"helps with my hiatal hernia\". She does see a cardiologist and typically sees her cardiologist every 6 months. She has had a prior stress test but seems that this was several years ago. Has never had any cardiac interventions that she mentions. It is unclear why she is following with a cardiologist based on her report of no cardiac issues. Because of her dysphagia and the concern for stricture she was referred to general surgery from the ER. Past Medical History        Past Medical History:   Diagnosis Date    COPD (chronic obstructive pulmonary disease) (HCC)      GERD (gastroesophageal reflux disease)      Hyperlipidemia              Past Surgical History         Past Surgical History:   Procedure Laterality Date    ESOPHAGOGASTRODUODENOSCOPY   02/03/2020            Current Facility-Administered Medications          Current Outpatient Medications   Medication Sig Dispense Refill    gabapentin (NEURONTIN) 400 MG capsule Take 400 mg by mouth at bedtime. aspirin 81 MG chewable tablet Take 81 mg by mouth daily        fosinopril (MONOPRIL) 40 MG tablet Take 40 mg by mouth daily        albuterol (PROVENTIL) (2.5 MG/3ML) 0.083% nebulizer solution USE 1 VIAL IN NEBULIZER 4 TIMES DAILY AS NEEDED FOR SHORTNESS OF BREATH WHEEZING        calcium carbonate 600 MG TABS tablet Take 1 tablet by mouth daily        acetaminophen (TYLENOL) 325 MG tablet Take 1,300 mg by mouth every 6 hours as needed for Pain        famotidine (PEPCID) 20 MG tablet Take 20 mg by mouth 2 times daily        nitroGLYCERIN (NITROSTAT) 0.4 MG SL tablet Place 0.4 mg under the tongue every 5 minutes as needed for Chest pain up to max of 3 total doses.  If no relief after 1 dose, call 911.        atorvastatin (LIPITOR) 40 MG tablet Take 40 mg by mouth daily        HYDROcodone-acetaminophen (NORCO) 5-325 MG per tablet Take 1 tablet by mouth every 6 hours as needed for Pain.        vitamin D (CHOLECALCIFEROL) 25 MCG (1000 UT) TABS tablet Take 1,000 Units by mouth daily        amitriptyline (ELAVIL) 10 MG tablet Take 10 mg by mouth nightly        traMADol (ULTRAM) 50 MG tablet Take 50 mg by mouth every 6 hours as needed for Pain.        gabapentin (NEURONTIN) 300 MG capsule Take 300 mg by mouth 3 times daily. No current facility-administered medications for this visit. Allergies   Allergen Reactions    Codeine Nausea And Vomiting         Family History   History reviewed. No pertinent family history. Social History               Socioeconomic History    Marital status:        Spouse name: Not on file    Number of children: Not on file    Years of education: Not on file    Highest education level: Not on file   Occupational History    Not on file   Tobacco Use    Smoking status: Former Smoker       Packs/day: 0.50       Years: 40.00       Pack years: 20.00       Quit date:        Years since quittin.5    Smokeless tobacco: Never Used   Substance and Sexual Activity    Alcohol use: Yes       Alcohol/week: 1.0 standard drink       Types: 1 Glasses of wine per week    Drug use: Never    Sexual activity: Not on file   Other Topics Concern    Not on file   Social History Narrative    Not on file      Social Determinants of Health          Financial Resource Strain: Low Risk    Difficulty of Paying Living Expenses: Not hard at all   Food Insecurity: No Food Insecurity    Worried About [address] in the Last Year: Never true    [facility] in the Last Year: Never true   Transportation Needs:    Lack of Transportation (Medical): Not on file    Lack of Transportation (Non-Medical):  Not on file   Physical Activity:    Days of Exercise per Week: Not on file    Minutes of Exercise per Session: Not on file   Stress:    Feeling of Stress : Not on file   Social Connections:    Frequency of Communication with Friends and Family: Not on file    Frequency of Social Gatherings with Friends and Family: Not on file    Attends Gnosticist Services: Not on file    Active Member of Clubs or Organizations: Not on file    Attends Club or Organization Meetings: Not on file    Marital Status: Not on file   Intimate Partner Violence:    Fear of Current or Ex-Partner: Not on file    Emotionally Abused: Not on file    Physically Abused: Not on file    Sexually Abused: Not on file   Housing Stability:    Unable to Pay for Housing in the Last Year: Not on file    Number of [place] in the Last Year: Not on file    Unstable Housing in the Last Year: Not on file            ROS:   Review of Systems - General ROS: negative  Psychological ROS: negative  Ophthalmic ROS: negative  ENT ROS: negative  Respiratory ROS: no cough, shortness of breath, or wheezing  Cardiovascular ROS: per HPI  Gastrointestinal ROS: per HPI  Genito-Urinary ROS: no dysuria, trouble voiding, or hematuria  Musculoskeletal ROS: negative  Dermatological ROS: negative        Objective       Vitals:     07/07/22 1502   BP: 130/84   Pulse: 78   Temp: 97.6 °F (36.4 °C)      General:in no apparent distress and well developed and well nourished  Eyes: No gross abnormalities. Ears, Nose, Throat: hearing grossly normal bilaterally  Neck: neck supple and non tender without mass  Lungs: clear to auscultation without wheezes or rales   Heart: S1S2, no mumurs, RRR  Abdomen: Soft, nondistended, tender to palpation in epigastrium and left upper quadrant without guarding or rebound, bowel sounds present  Extremity: negative  Neuro: CN II-XII grossly intact        1.  Esophageal dysphagia  Assessment & Plan:  Certainly based on her history of prior strictures and her current symptoms I think that she probably does have some esophageal stricture as contributing to her current symptoms. However this could also have some contribution from poor esophageal motility or other issues. I am going to start with esophagram just to get a better idea of the significance of this stricturing to see if there is any other pathology in the esophagus that may be contributing to her symptoms and to look at the motility of the esophagus. I will tentatively begin planning for EGD with dilation. Risks of the procedure including bleeding, infection, perforation, need for the surgery and anesthesia risk are discussed and consent is obtained. I have encouraged the patient to continue her Pepcid daily. We also discussed trying to avoid breads is much as possible and if she is going to eat any bread or meat that she needs to chew this very well before swallowing and that she should drink plenty of liquids with her meals as well. 2. Chest pain, unspecified type  Assessment & Plan:  As the patient is having chest pain I cannot rule out that this could be related to her stricture in the esophagus potentially or even to esophageal spasms. However as she describes her pain it is concerning that she is reporting that the pain radiates up into her jaw and that is relieved with nitroglycerin. She does see a cardiologist but from her report has no cardiac diagnoses which would require following with a cardiologist.  I am going to obtain her records from her cardiologist in [place] to see what her most recent visit was about and its possible or even likely that she is getting need some sort of cardiac clearance before undergoing any sort of procedure or anesthesia just based on those symptoms. We will have anesthesia review her chart as well and we will get any further work-up for the procedure as needed. She may end up needing to see primary or even a cardiologist here.            (Please note that portions of this note were completed with a voice recognition program.  Efforts were made to edit the dictations but occasionally words are mis-transcribed.)    The patient was interviewed and examined and there have been no changes since the above History and Physical.    Electronically signed by Bhupendra Garcia DO on 7/27/2022 at 9:53 AM

## 2022-07-27 NOTE — DISCHARGE INSTRUCTIONS
Discharge Instructions for EGD     An EGD or upper GI endoscopy is a visual exam of the lining of the esophagus, stomach (gastric) and duodenum (the first part of the small intestine). An endoscopy is a flexible tube with a light and a viewing device. It allows the doctor to view the inside of the colon through a tiny video camera. EGD is performed for many reasons: unexplained anemia , pain, bleeding, heart burn, among many other reasons. Complications from a EGD are rare. Some possible serious complications include perforated bowel (which might require surgery) and bleeding (which could require blood transfusion ). Minor complications include bloating, gas, and cramping that can last for 1-2 days after the procedure. Because air is put into your upper GI during the procedure, it is normal to pass large amounts of air from your rectum and burp a lot. What You Will Need   Someone to drive you home after the procedure    Steps to [facility] when you get home. Because the sedative will make you drowsy, don't drive, operate machinery, or make important decisions the day of the procedure. Feelings of bloating, gas, or cramping may persist for 24 hours. Diet    Try sips of water first. If tolerated, resume regular diet or the diet recommended by your physician. Do not drink alcohol for 24 hours. Physical Activity    You may return to work tomorrow. Do not drive, operate heavy machinery, or do activities that require coordination or balance until tomorrow  Otherwise, return to your normal routine as soon as you are comfortable to do so, which is usually the next day after the procedure. Medications    When taking medications, it's important to: Take your medication as directednot more, not less, not at a different time. Do not stop taking them without consulting your healthcare provider. Don't share them with anyone else.    Know what effects and side effects to expect, and report them to your healthcare provider. If you are taking more than one drug, even if it is an over-the-counter medication, herb, or dietary supplement, be sure to check with a physician or pharmacist about drug interactions. Plan ahead for refills so you don't run out. Follow-up   You will be called with your results usually within a week or  We will have you make a follow up appointment if needed  You are always welcome to follow up in person if you have questions or there are other issue you would like addressed      Call Your Doctor If Any of the Following Occurs   Monitor your recovery once you leave the hospital. As soon as you have a problem, alert your doctor. If any of the following occur, call your doctor or come to the emergency room  Bleeding from your rectum; notify your doctor if you pass a teaspoonful or more of blood   Black, tarry stools   Severe abdominal or chest pain pain   Hard, swollen abdomen   Signs of infection, including fever or chills   Inability to pass gas or stool   Coughing, shortness of breath, chest pain, severe nausea or vomiting   In case of an emergency, call 911 immediately.

## 2022-07-28 ENCOUNTER — HOSPITAL ENCOUNTER (OUTPATIENT)
Age: 77
Setting detail: OUTPATIENT SURGERY
Discharge: HOME | End: 2022-07-28
Attending: SURGERY | Admitting: SURGERY
Payer: MEDICARE

## 2022-07-28 ENCOUNTER — ANESTHESIA EVENT (OUTPATIENT)
Dept: OPERATING ROOM | Age: 77
End: 2022-07-28
Payer: MEDICARE

## 2022-07-28 ENCOUNTER — ANESTHESIA (OUTPATIENT)
Dept: OPERATING ROOM | Age: 77
End: 2022-07-28
Payer: MEDICARE

## 2022-07-28 VITALS
DIASTOLIC BLOOD PRESSURE: 80 MMHG | HEIGHT: 61 IN | OXYGEN SATURATION: 96 % | HEART RATE: 88 BPM | TEMPERATURE: 96.9 F | BODY MASS INDEX: 32.66 KG/M2 | SYSTOLIC BLOOD PRESSURE: 147 MMHG | WEIGHT: 173 LBS | RESPIRATION RATE: 16 BRPM

## 2022-07-28 DIAGNOSIS — R13.19 ESOPHAGEAL DYSPHAGIA: ICD-10-CM

## 2022-07-28 PROCEDURE — 2709999900 HC NON-CHARGEABLE SUPPLY: Performed by: SURGERY

## 2022-07-28 PROCEDURE — 3700000001 HC ADD 15 MINUTES (ANESTHESIA): Performed by: SURGERY

## 2022-07-28 PROCEDURE — 43249 ESOPH EGD DILATION <30 MM: CPT | Performed by: SURGERY

## 2022-07-28 PROCEDURE — 3700000000 HC ANESTHESIA ATTENDED CARE: Performed by: SURGERY

## 2022-07-28 PROCEDURE — 7100000011 HC PHASE II RECOVERY - ADDTL 15 MIN: Performed by: SURGERY

## 2022-07-28 PROCEDURE — 2580000003 HC RX 258: Performed by: SURGERY

## 2022-07-28 PROCEDURE — 88305 TISSUE EXAM BY PATHOLOGIST: CPT

## 2022-07-28 PROCEDURE — 43239 EGD BIOPSY SINGLE/MULTIPLE: CPT | Performed by: SURGERY

## 2022-07-28 PROCEDURE — 6360000002 HC RX W HCPCS: Performed by: NURSE ANESTHETIST, CERTIFIED REGISTERED

## 2022-07-28 PROCEDURE — 2500000003 HC RX 250 WO HCPCS: Performed by: NURSE ANESTHETIST, CERTIFIED REGISTERED

## 2022-07-28 PROCEDURE — 2720000010 HC SURG SUPPLY STERILE: Performed by: SURGERY

## 2022-07-28 PROCEDURE — 7100000010 HC PHASE II RECOVERY - FIRST 15 MIN: Performed by: SURGERY

## 2022-07-28 PROCEDURE — 3609017700 HC EGD DILATION GASTRIC/DUODENAL STRICTURE: Performed by: SURGERY

## 2022-07-28 RX ORDER — LIDOCAINE HYDROCHLORIDE 20 MG/ML
INJECTION, SOLUTION EPIDURAL; INFILTRATION; INTRACAUDAL; PERINEURAL PRN
Status: DISCONTINUED | OUTPATIENT
Start: 2022-07-28 | End: 2022-07-28 | Stop reason: SDUPTHER

## 2022-07-28 RX ORDER — SODIUM CHLORIDE 9 MG/ML
INJECTION, SOLUTION INTRAVENOUS PRN
Status: DISCONTINUED | OUTPATIENT
Start: 2022-07-28 | End: 2022-07-28 | Stop reason: HOSPADM

## 2022-07-28 RX ORDER — SODIUM CHLORIDE 0.9 % (FLUSH) 0.9 %
5-40 SYRINGE (ML) INJECTION EVERY 12 HOURS SCHEDULED
Status: DISCONTINUED | OUTPATIENT
Start: 2022-07-28 | End: 2022-07-28 | Stop reason: HOSPADM

## 2022-07-28 RX ORDER — PROPOFOL 10 MG/ML
INJECTION, EMULSION INTRAVENOUS PRN
Status: DISCONTINUED | OUTPATIENT
Start: 2022-07-28 | End: 2022-07-28 | Stop reason: SDUPTHER

## 2022-07-28 RX ORDER — SODIUM CHLORIDE, SODIUM LACTATE, POTASSIUM CHLORIDE, CALCIUM CHLORIDE 600; 310; 30; 20 MG/100ML; MG/100ML; MG/100ML; MG/100ML
INJECTION, SOLUTION INTRAVENOUS CONTINUOUS
Status: DISCONTINUED | OUTPATIENT
Start: 2022-07-28 | End: 2022-07-28 | Stop reason: HOSPADM

## 2022-07-28 RX ORDER — ONDANSETRON 2 MG/ML
INJECTION INTRAMUSCULAR; INTRAVENOUS PRN
Status: DISCONTINUED | OUTPATIENT
Start: 2022-07-28 | End: 2022-07-28 | Stop reason: SDUPTHER

## 2022-07-28 RX ORDER — SODIUM CHLORIDE 0.9 % (FLUSH) 0.9 %
5-40 SYRINGE (ML) INJECTION PRN
Status: DISCONTINUED | OUTPATIENT
Start: 2022-07-28 | End: 2022-07-28 | Stop reason: HOSPADM

## 2022-07-28 RX ADMIN — PROPOFOL 40 MG: 10 INJECTION, EMULSION INTRAVENOUS at 09:45

## 2022-07-28 RX ADMIN — ONDANSETRON 4 MG: 2 INJECTION INTRAMUSCULAR; INTRAVENOUS at 09:32

## 2022-07-28 RX ADMIN — SODIUM CHLORIDE, POTASSIUM CHLORIDE, SODIUM LACTATE AND CALCIUM CHLORIDE: 600; 310; 30; 20 INJECTION, SOLUTION INTRAVENOUS at 09:24

## 2022-07-28 RX ADMIN — PROPOFOL 40 MG: 10 INJECTION, EMULSION INTRAVENOUS at 09:40

## 2022-07-28 RX ADMIN — PROPOFOL 60 MG: 10 INJECTION, EMULSION INTRAVENOUS at 09:34

## 2022-07-28 RX ADMIN — PROPOFOL 30 MG: 10 INJECTION, EMULSION INTRAVENOUS at 09:37

## 2022-07-28 RX ADMIN — LIDOCAINE HYDROCHLORIDE 100 MG: 20 INJECTION, SOLUTION EPIDURAL; INFILTRATION; INTRACAUDAL; PERINEURAL at 09:32

## 2022-07-28 ASSESSMENT — PAIN - FUNCTIONAL ASSESSMENT: PAIN_FUNCTIONAL_ASSESSMENT: 0-10

## 2022-07-28 ASSESSMENT — COPD QUESTIONNAIRES: CAT_SEVERITY: NO INTERVAL CHANGE

## 2022-07-28 NOTE — ANESTHESIA POSTPROCEDURE EVALUATION
Department of Anesthesiology  Postprocedure Note    Patient: Lisette Johnson  MRN: [id number]  YOB: 1945  Date of evaluation: 7/28/2022      Procedure Summary     Date: 07/28/22 Room / Location: [address]    Anesthesia Start: 3163 Anesthesia Stop: 2412    Procedure: EGD DILATION BALLOON, WITH BIOPSY Diagnosis:       Esophageal dysphagia      (Esophageal dysphagia [R13.19])    Surgeons: Annmarie Justice DO Responsible Provider: DO Sanches CRNA    Anesthesia Type: general ASA Status: 2          Anesthesia Type: No value filed.     Josy Phase I: Josy Score: 10    Josy Phase II: Josy Score: 8      Anesthesia Post Evaluation    Patient location during evaluation: bedside  Patient participation: complete - patient participated  Level of consciousness: awake and alert  Pain score: 0  Airway patency: patent  Nausea & Vomiting: no vomiting  Complications: no  Cardiovascular status: hemodynamically stable  Respiratory status: acceptable  Hydration status: euvolemic

## 2022-07-28 NOTE — OP NOTE
[address]                 [address] [place] [address]                                OPERATIVE REPORT    PATIENT NAME: Cristal Roberson                  :        1945  MED REC NO:   [id number]                             ROOM:  ACCOUNT NO:   [de-identified]                           ADMIT DATE: 2022  PROVIDER:     Janelle Bautista    DATE OF PROCEDURE:  2022    SURGEON:  Dr. Janelle Bautista. ASSISTANT:  None. PREOPERATIVE DIAGNOSIS:  Dysphagia. POSTOPERATIVE DIAGNOSIS:  Dysphagia. PROCEDURE:  EGD with biopsy and balloon dilation of esophagus. ANESTHESIA:  MAC.    ESTIMATED BLOOD LOSS:  Minimal.    FLUIDS:  Per anesthesia record. COMPLICATIONS:  None. SPECIMEN:  Biopsy in antrum. INDICATIONS FOR PROCEDURE  The patient is a 59-year-old female who is  referred to my office for complaints of dysphagia. It seems that the  patient has had issues in the past and was placed on the nitroglycerin  for what she described as treatment of her hiatal hernia, but  unfortunately we did not have records [place] However, she states that recently she has been having worsening  dysphagia where she has the sensation of food getting stuck in the lower  chest.  We got an esophagram that did show some stricturing of distal  esophagus with nonpassage of a barium tablet on the esophagram.   Decision was made to proceed with EGD and likely balloon dilation. Prior to the day of the procedure, risks, benefits, and alternatives  were explained to the patient and consent was obtained. DESCRIPTION OF PROCEDURE:  The patient was brought to endoscopy suite,  kept on preoperative gurney and placed in the left lateral decubitus  position. Monitoring devices were placed. MAC anesthesia was induced. After induction of anesthesia, time-out was performed and correct  patient and procedure were verified.   Bite block was placed. The  Olympus video endoscope was lubricated, inserted into the patient's  oropharynx and directed into the esophagus under visualization. I  advanced the scope slowly down the esophagus and once we got to about 38  to 40 cm, it did seem that it was fairly narrow in this area. I was  able to manipulate the scope through, but on that initial intubation  into the stomach, I did have to do a little bit of manipulation and  twisting of the scope to actually get it to go through that area. Once  in the stomach, it was insufflated with air and initial inspection  showed no obvious abnormalities. The scope was then further advanced  down through the pylorus into duodenal bulb and manipulated through  duodenal sweep. Once we were at the third portion of duodenum, the  scope was slowly withdrawn carefully inspecting mucosa. The duodenal  mucosa was grossly unremarkable and there was no evidence of ulcers or  other pathology. The scope was then withdrawn back into the distal  stomach and inspection here showed perhaps some mild gastritis. I did  take a biopsy in the antrum and sent this as our first specimen. A  retroflex view was then obtained which showed a very small sliding  hiatal hernia. There were no other proximal gastric abnormalities and  the remainder of the gastric mucosa appeared healthy. At this point, I  went ahead and withdrew the scope back into the distal esophagus and the  GE junction appeared to be about 40 cm. As the scope was able to pass  through this area without too much difficulty, I went ahead and had a 12  to 15 mm balloon opened. The scope was advanced into the stomach and  the balloon was advanced into good placement so it could be visualized  and then I withdrew the scope and the balloon back into the esophagus so  that the balloon was sitting across the distal portion of the esophagus  and the GE junction.   At this point, I went ahead and inflated the  balloon to 12 mm initially which was done with ease and it did seem to  be just a little bit of space, so I went ahead and took the balloon up  to 13.5 mm at this point. This came into good approximation with the  esophageal mucosa and this was held in place for approximately 45  seconds. Once we had that done, I took the balloon down inspected the  area and the scope certainly passed through this area easier after the  initial dilation. The mucosa still appeared healthy and there was no  evidence of any injury. I brought this balloon back into a good  placement and then inflated the balloon to 15 mm and held this in place  for approximately 45 seconds. As we were holding this, I did see the  mucosa at GE junction or just above the GE junction split as I was  watching it through the balloon. Once we had the balloon down, I again  inspected the area and it did appear that on the posterior aspect of the  esophagus, there was a split in the mucosa, but this only seemed to be  involving the mucosa and the underlying tissue appeared healthy and  there was no evidence of perforation. As we had dilated up to 15 mmHg  and certainly there was esophageal mucosal injury with that dilation up  to 15 mm, I did not feel that it was safe to proceed further for fear of  causing perforation. The balloon was withdrawn. I irrigated this area  and it seemed that we had fairly good hemostasis in the area. I then  reintubated the stomach and the scope passed through the GE junction  with ease. Stomach was suctioned of air and then I slowly withdrew the  scope through the esophagus carefully inspecting the remainder of the  mucosa. There were no other abnormalities or strictures in the  esophagus. Once in the posterior oropharynx, inspection showed no  obvious abnormalities. Scope was then straightened and removed, and  procedure was concluded.   At conclusion of the procedure, the patient  was in stable condition and was taken to the PACU for recovery. PLAN:  The patient will be kept on a soft diet for next 48 hours and  then we can advance to regular diet as tolerated. I am going to give  her a short course of some Carafate and she should remain on PPI  therapy. We will plan to follow up in about 6 months. If she is not  here, she should probably plan to follow up with either GI or general  surgeon who does endoscopy in her home state.         Crescencio Hernandez    D: 07/28/2022 9:58:31       T: 07/28/2022 10:04:02     [email or web address]  Job#: [id number]     Doc#: [id number]    CC:  Primary Care

## 2022-07-28 NOTE — ANESTHESIA PRE PROCEDURE
Department of Anesthesiology  Preprocedure Note       Name:  Sky Eddy   Age:  76 y.o.  :  1945                                          MRN:  [id number]         Date:  2022      Surgeon: Lord Santos):  Emily Moreno DO    Procedure: Procedure(s):  EGD w/ poss dilatation    Medications prior to admission:   Prior to Admission medications    Medication Sig Start Date End Date Taking? Authorizing Provider   gabapentin (NEURONTIN) 400 MG capsule Take 400 mg by mouth at bedtime. Historical Provider, MD   aspirin 81 MG chewable tablet Take 81 mg by mouth daily    Historical Provider, MD   fosinopril (MONOPRIL) 40 MG tablet Take 40 mg by mouth daily    Historical Provider, MD   albuterol (PROVENTIL) (2.5 MG/3ML) 0.083% nebulizer solution USE 1 VIAL IN NEBULIZER 4 TIMES DAILY AS NEEDED FOR SHORTNESS OF BREATH WHEEZING 21   Historical Provider, MD   calcium carbonate 600 MG TABS tablet Take 1 tablet by mouth daily    Historical Provider, MD   acetaminophen (TYLENOL) 325 MG tablet Take 1,300 mg by mouth every 6 hours as needed for Pain    Historical Provider, MD   famotidine (PEPCID) 20 MG tablet Take 20 mg by mouth 2 times daily    Historical Provider, MD   nitroGLYCERIN (NITROSTAT) 0.4 MG SL tablet Place 0.4 mg under the tongue every 5 minutes as needed for Chest pain up to max of 3 total doses. If no relief after 1 dose, call 911. Historical Provider, MD   atorvastatin (LIPITOR) 40 MG tablet Take 40 mg by mouth daily    Historical Provider, MD   HYDROcodone-acetaminophen (NORCO) 5-325 MG per tablet Take 1 tablet by mouth every 6 hours as needed for Pain. Historical Provider, MD   vitamin D (CHOLECALCIFEROL) 25 MCG (1000 UT) TABS tablet Take 1,000 Units by mouth daily    Historical Provider, MD   amitriptyline (ELAVIL) 10 MG tablet Take 10 mg by mouth nightly    Historical Provider, MD   traMADol (ULTRAM) 50 MG tablet Take 50 mg by mouth every 6 hours as needed for Pain.     Historical LUNA provided pt with [facility] phone number ([phone]) so that pt could schedule transportation home from hospital this date.  LUNA received order for HH, pt states he would like to continue service with [facility].  Faxed choice form to HUSSEIN Munoz.     Provider, MD   gabapentin (NEURONTIN) 300 MG capsule Take 300 mg by mouth 3 times daily. Historical Provider, MD       Current medications:    Current Facility-Administered Medications   Medication Dose Route Frequency Provider Last Rate Last Admin    lactated ringers infusion   IntraVENous Continuous Renuka Headings,  mL/hr at 22 0924 New Bag at 22 0924    sodium chloride flush 0.9 % injection 5-40 mL  5-40 mL IntraVENous 2 times per day Renuka Headings, DO        sodium chloride flush 0.9 % injection 5-40 mL  5-40 mL IntraVENous PRN Renuka Headings, DO        0.9 % sodium chloride infusion   IntraVENous PRN Renuka Headings, DO           Allergies: Allergies   Allergen Reactions    Codeine Nausea And Vomiting       Problem List:    Patient Active Problem List   Diagnosis Code    Esophageal dysphagia R13.19    Chest pain R07.9       Past Medical History:        Diagnosis Date    COPD (chronic obstructive pulmonary disease) ([facility] [address]     GERD (gastroesophageal reflux disease)     Hyperlipidemia        Past Surgical History:        Procedure Laterality Date    ESOPHAGOGASTRODUODENOSCOPY  2020       Social History:    Social History     Tobacco Use    Smoking status: Former     Packs/day: 0.50     Years: 40.00     Pack years: 20.00     Types: Cigarettes     Quit date:      Years since quittin.5    Smokeless tobacco: Never   Substance Use Topics    Alcohol use:  Yes     Alcohol/week: 1.0 standard drink     Types: 1 Glasses of wine per week                                Counseling given: Not Answered      Vital Signs (Current):   Vitals:    22 0856   BP: (!) 155/93   Pulse: 97   Resp: 16   Temp: 36.1 °C (96.9 °F)   TempSrc: Temporal   SpO2: 97%   Weight: 173 lb (78.5 kg)   Height: 5' 1\" (1.549 m)                                              BP Readings from Last 3 Encounters:   22 (!) 155/93   22 130/84   22 132/78       NPO Status: Time of Induction: intravenous. Anesthetic plan and risks discussed with patient. Use of blood products discussed with patient whom consented to blood products.    Plan discussed with surgical team.                    DO Pardo - CRNA   7/28/2022

## 2022-07-30 LAB — SURGICAL PATHOLOGY REPORT: NORMAL

## 2023-06-01 ENCOUNTER — TELEPHONE (OUTPATIENT)
Dept: FAMILY MEDICINE CLINIC | Age: 78
End: 2023-06-01

## 2023-06-01 ENCOUNTER — HOSPITAL ENCOUNTER (OUTPATIENT)
Dept: CT IMAGING | Age: 78
Discharge: HOME | End: 2023-06-01
Attending: FAMILY MEDICINE
Payer: MEDICARE

## 2023-06-01 ENCOUNTER — OFFICE VISIT (OUTPATIENT)
Dept: FAMILY MEDICINE CLINIC | Age: 78
End: 2023-06-01
Payer: MEDICARE

## 2023-06-01 VITALS
BODY MASS INDEX: 31.72 KG/M2 | RESPIRATION RATE: 18 BRPM | HEIGHT: 61 IN | HEART RATE: 102 BPM | DIASTOLIC BLOOD PRESSURE: 72 MMHG | SYSTOLIC BLOOD PRESSURE: 126 MMHG | TEMPERATURE: 98.4 F | WEIGHT: 168 LBS | OXYGEN SATURATION: 97 %

## 2023-06-01 DIAGNOSIS — Z91.81 AT HIGH RISK FOR FALLS: ICD-10-CM

## 2023-06-01 DIAGNOSIS — I10 PRIMARY HYPERTENSION: ICD-10-CM

## 2023-06-01 DIAGNOSIS — R10.9 ABDOMINAL PAIN, UNSPECIFIED ABDOMINAL LOCATION: ICD-10-CM

## 2023-06-01 DIAGNOSIS — R19.7 DIARRHEA OF PRESUMED INFECTIOUS ORIGIN: ICD-10-CM

## 2023-06-01 DIAGNOSIS — J44.1 COPD EXACERBATION (HCC): Primary | ICD-10-CM

## 2023-06-01 LAB
ALBUMIN SERPL BCG-MCNC: 4 G/DL (ref 3.5–5.1)
ALP SERPL-CCNC: 112 U/L (ref 38–126)
ALT SERPL W/O P-5'-P-CCNC: 11 U/L (ref 11–66)
ANION GAP SERPL CALC-SCNC: 17 MEQ/L (ref 8–16)
AST SERPL-CCNC: 16 U/L (ref 5–40)
BASOPHILS ABSOLUTE: 0.1 THOU/MM3 (ref 0–0.1)
BASOPHILS NFR BLD AUTO: 0.7 %
BILIRUB SERPL-MCNC: 0.5 MG/DL (ref 0.3–1.2)
BUN SERPL-MCNC: 13 MG/DL (ref 7–22)
CALCIUM SERPL-MCNC: 9.4 MG/DL (ref 8.5–10.5)
CHLORIDE SERPL-SCNC: 99 MEQ/L (ref 98–111)
CHOLEST SERPL-MCNC: 135 MG/DL (ref 100–199)
CO2 SERPL-SCNC: 22 MEQ/L (ref 23–33)
CREAT SERPL-MCNC: 0.9 MG/DL (ref 0.4–1.2)
DEPRECATED RDW RBC AUTO: 45.7 FL (ref 35–45)
EOSINOPHIL NFR BLD AUTO: 2.3 %
EOSINOPHILS ABSOLUTE: 0.3 THOU/MM3 (ref 0–0.4)
ERYTHROCYTE [DISTWIDTH] IN BLOOD BY AUTOMATED COUNT: 13.2 % (ref 11.5–14.5)
GFR SERPL CREATININE-BSD FRML MDRD: > 60 ML/MIN/1.73M2
GLUCOSE SERPL-MCNC: 88 MG/DL (ref 70–108)
HCT VFR BLD AUTO: 39.8 % (ref 37–47)
HDLC SERPL-MCNC: 38 MG/DL
HGB BLD-MCNC: 12.9 GM/DL (ref 12–16)
IMM GRANULOCYTES # BLD AUTO: 0.07 THOU/MM3 (ref 0–0.07)
IMM GRANULOCYTES NFR BLD AUTO: 0.6 %
LDLC SERPL CALC-MCNC: 68 MG/DL
LIPASE SERPL-CCNC: 16.4 U/L (ref 5.6–51.3)
LYMPHOCYTES ABSOLUTE: 1.8 THOU/MM3 (ref 1–4.8)
LYMPHOCYTES NFR BLD AUTO: 13.9 %
MCH RBC QN AUTO: 30.7 PG (ref 26–33)
MCHC RBC AUTO-ENTMCNC: 32.4 GM/DL (ref 32.2–35.5)
MCV RBC AUTO: 94.8 FL (ref 81–99)
MONOCYTES ABSOLUTE: 0.9 THOU/MM3 (ref 0.4–1.3)
MONOCYTES NFR BLD AUTO: 7 %
NEUTROPHILS NFR BLD AUTO: 75.5 %
NRBC BLD AUTO-RTO: 0 /100 WBC
PLATELET # BLD AUTO: 309 THOU/MM3 (ref 130–400)
PMV BLD AUTO: 11.8 FL (ref 9.4–12.4)
POC CREATININE WHOLE BLOOD: 0.9 MG/DL (ref 0.5–1.2)
POTASSIUM SERPL-SCNC: 3.5 MEQ/L (ref 3.5–5.2)
PROT SERPL-MCNC: 7 G/DL (ref 6.1–8)
RBC # BLD AUTO: 4.2 MILL/MM3 (ref 4.2–5.4)
SEGMENTED NEUTROPHILS ABSOLUTE COUNT: 9.5 THOU/MM3 (ref 1.8–7.7)
SODIUM SERPL-SCNC: 138 MEQ/L (ref 135–145)
TRIGL SERPL-MCNC: 144 MG/DL (ref 0–199)
WBC # BLD AUTO: 12.6 THOU/MM3 (ref 4.8–10.8)

## 2023-06-01 PROCEDURE — 81003 URINALYSIS AUTO W/O SCOPE: CPT | Performed by: FAMILY MEDICINE

## 2023-06-01 PROCEDURE — 36415 COLL VENOUS BLD VENIPUNCTURE: CPT | Performed by: FAMILY MEDICINE

## 2023-06-01 PROCEDURE — 3078F DIAST BP <80 MM HG: CPT | Performed by: FAMILY MEDICINE

## 2023-06-01 PROCEDURE — 1123F ACP DISCUSS/DSCN MKR DOCD: CPT | Performed by: FAMILY MEDICINE

## 2023-06-01 PROCEDURE — 3023F SPIROM DOC REV: CPT | Performed by: FAMILY MEDICINE

## 2023-06-01 PROCEDURE — 82565 ASSAY OF CREATININE: CPT

## 2023-06-01 PROCEDURE — 1036F TOBACCO NON-USER: CPT | Performed by: FAMILY MEDICINE

## 2023-06-01 PROCEDURE — G8417 CALC BMI ABV UP PARAM F/U: HCPCS | Performed by: FAMILY MEDICINE

## 2023-06-01 PROCEDURE — G8400 PT W/DXA NO RESULTS DOC: HCPCS | Performed by: FAMILY MEDICINE

## 2023-06-01 PROCEDURE — 1090F PRES/ABSN URINE INCON ASSESS: CPT | Performed by: FAMILY MEDICINE

## 2023-06-01 PROCEDURE — G8427 DOCREV CUR MEDS BY ELIG CLIN: HCPCS | Performed by: FAMILY MEDICINE

## 2023-06-01 PROCEDURE — 99204 OFFICE O/P NEW MOD 45 MIN: CPT | Performed by: FAMILY MEDICINE

## 2023-06-01 PROCEDURE — 3074F SYST BP LT 130 MM HG: CPT | Performed by: FAMILY MEDICINE

## 2023-06-01 PROCEDURE — 74178 CT ABD&PLV WO CNTR FLWD CNTR: CPT

## 2023-06-01 PROCEDURE — 6360000004 HC RX CONTRAST MEDICATION: Performed by: FAMILY MEDICINE

## 2023-06-01 RX ADMIN — IOPAMIDOL 80 ML: 755 INJECTION, SOLUTION INTRAVENOUS at 13:48

## 2023-06-01 SDOH — ECONOMIC STABILITY: FOOD INSECURITY: WITHIN THE PAST 12 MONTHS, THE FOOD YOU BOUGHT JUST DIDN'T LAST AND YOU DIDN'T HAVE MONEY TO GET MORE.: NEVER TRUE

## 2023-06-01 SDOH — ECONOMIC STABILITY: HOUSING INSECURITY
IN THE LAST 12 MONTHS, WAS THERE A TIME WHEN YOU DID NOT HAVE A STEADY PLACE TO SLEEP OR SLEPT IN A SHELTER (INCLUDING NOW)?: NO

## 2023-06-01 SDOH — ECONOMIC STABILITY: FOOD INSECURITY: WITHIN THE PAST 12 MONTHS, YOU WORRIED THAT YOUR FOOD WOULD RUN OUT BEFORE YOU GOT MONEY TO BUY MORE.: NEVER TRUE

## 2023-06-01 SDOH — ECONOMIC STABILITY: INCOME INSECURITY: HOW HARD IS IT FOR YOU TO PAY FOR THE VERY BASICS LIKE FOOD, HOUSING, MEDICAL CARE, AND HEATING?: NOT HARD AT ALL

## 2023-06-01 ASSESSMENT — ENCOUNTER SYMPTOMS
BLOOD IN STOOL: 0
RECTAL PAIN: 0
COUGH: 0
SHORTNESS OF BREATH: 0
VOMITING: 1
CONSTIPATION: 0
NAUSEA: 0
ANAL BLEEDING: 0
BACK PAIN: 1
DIARRHEA: 1

## 2023-06-01 ASSESSMENT — PATIENT HEALTH QUESTIONNAIRE - PHQ9
1. LITTLE INTEREST OR PLEASURE IN DOING THINGS: 0
SUM OF ALL RESPONSES TO PHQ QUESTIONS 1-9: 0
SUM OF ALL RESPONSES TO PHQ9 QUESTIONS 1 & 2: 0
SUM OF ALL RESPONSES TO PHQ QUESTIONS 1-9: 0
2. FEELING DOWN, DEPRESSED OR HOPELESS: 0

## 2023-06-01 NOTE — TELEPHONE ENCOUNTER
----- Message from Coy Felty, MD sent at 6/1/2023  3:17 PM EDT -----  Good news nothing acute on CT abdomen. Multiple chronic findings (can review report with patient). Await additional results. Re-check in 1 week and can discuss findings in detail then.

## 2023-06-01 NOTE — PROGRESS NOTES
[address] [address]  Dept: [phone]  Dept Fax: [phone]  Loc: [phone]      Summer Wise is a 76 y.o. female who presents todayfor her medical conditions/complaints as noted below. Summer Wise is c/o of Diarrhea (Started 2 weeks ago. Green, slimy stools. /Right back side hurting, swelling. No painful urination. ) and Other (Tramadol doesn't help with pain anymore)      : HPI    Here for pain today. Right back kidney area. Also with green slimy stools. Diarrhea for the past 2 weeks. Has used 2 boxes of 24 count imodium with some relief. Marcello with similar symptoms. Adria Murcia also present for visit. On tramadol for osteoarthritis. Out for about a month. This quit working for her. Rarely uses if tylenol arthritis does not work. Also uses motrin. History of c difficile but this feels different. Usually active and driving. History of hiatal hernia. History of kidney stones. Patient Active Problem List   Diagnosis    Esophageal dysphagia    Chest pain    COPD exacerbation (HCC)      Goals    None     The patient is allergic to codeine. Medical History  Madhav Estrada has a past medical history of COPD (chronic obstructive pulmonary disease) ([address] GERD (gastroesophageal reflux disease), and Hyperlipidemia. Past SurgicalHistory  The patient  has a past surgical history that includes Esophagogastroduodenoscopy (02/03/2020) and Upper gastrointestinal endoscopy (N/A, 7/28/2022). Family History  This patient's family history is not on file. Social History  Madhav Estrada  reports that she quit smoking about 15 years ago. Her smoking use included cigarettes. She has a 20.00 pack-year smoking history. She has been exposed to tobacco smoke. She has never used smokeless tobacco. She reports current alcohol use of about 1.0 standard drink per week.  She reports that she

## 2023-06-02 ENCOUNTER — TELEPHONE (OUTPATIENT)
Dept: FAMILY MEDICINE CLINIC | Age: 78
End: 2023-06-02

## 2023-06-02 ENCOUNTER — NURSE ONLY (OUTPATIENT)
Dept: FAMILY MEDICINE CLINIC | Age: 78
End: 2023-06-02

## 2023-06-02 DIAGNOSIS — R19.7 DIARRHEA OF PRESUMED INFECTIOUS ORIGIN: ICD-10-CM

## 2023-06-02 DIAGNOSIS — R10.9 ABDOMINAL PAIN, UNSPECIFIED ABDOMINAL LOCATION: ICD-10-CM

## 2023-06-02 LAB
C CAYETANENSIS DNA SPEC QL NAA+PROBE: NOT DETECTED
CAMPY SP DNA.DIARRHEA STL QL NAA+PROBE: NOT DETECTED
CRYPTOSP DNA SPEC QL NAA+PROBE: NOT DETECTED
E COLI O157H7 DNA SPEC QL NAA+PROBE: NORMAL
E HISTOLYT DNA SPEC QL NAA+PROBE: NOT DETECTED
EAEC PAA PLAS AGGR+AATA ST NAA+NON-PRB: NOT DETECTED
EC STX1+STX2 + H7 FLIC SPEC NAA+PROBE: NOT DETECTED
EPEC EAE GENE STL QL NAA+NON-PROBE: NOT DETECTED
ETEC LTA+ST1A+ST1B TOX ST NAA+NON-PROBE: NOT DETECTED
G LAMBLIA DNA SPEC QL NAA+PROBE: NOT DETECTED
HADV DNA SPEC QL NAA+PROBE: NOT DETECTED
HASTV RNA SPEC QL NAA+PROBE: NOT DETECTED
NOROVIRUS GI + GII RNA STL NAA+PROBE: NOT DETECTED
P SHIGELLOIDES DNA STL QL NAA+PROBE: NOT DETECTED
RV RNA SPEC QL NAA+PROBE: NOT DETECTED
SALMONELLA DNA SPEC QL NAA+PROBE: NOT DETECTED
SAPOVIRUS RNA SPEC QL NAA+PROBE: NOT DETECTED
SHIGELLA SP+EIEC IPAH ST NAA+NON-PROBE: NOT DETECTED
TSH SERPL DL<=0.005 MIU/L-ACNC: 2.89 UIU/ML (ref 0.4–4.2)
V CHOLERAE DNA SPEC QL NAA+PROBE: NOT DETECTED
VIBRIO DNA SPEC NAA+PROBE: NOT DETECTED
Y ENTERO RECN STL QL NAA+PROBE: NOT DETECTED

## 2023-06-02 NOTE — TELEPHONE ENCOUNTER
Daughter notified that script was called into [facility], Kasie Bernstein- cancelled Rx at [facility] in [place]

## 2023-06-02 NOTE — TELEPHONE ENCOUNTER
Spoke with daughter, Rosendo Sy. Stool sample has been dropped off here. Patient will provide a urine sample at her Monday office visit with Dr. Satnam Paz. Thank you.

## 2023-06-02 NOTE — TELEPHONE ENCOUNTER
Daughter, Belkis Calix stopped in. States that mother is in a lot of pain and is wondering if Dr. Toño Segundo could prescribe something for that. Has taken tramadol previously but states that it hasn't worked for her for awhile for other pain. [facility] (Pharmacy open until 5pm and not open on the weekends)   [address] [facility], Anthony Dey if need be)  Daughter's phone: [phone]. Please advise.

## 2023-06-03 LAB
C DIFF GDH STL QL: NEGATIVE
H PYLORI AG STL QL IA: NORMAL

## 2023-06-05 ENCOUNTER — OFFICE VISIT (OUTPATIENT)
Dept: FAMILY MEDICINE CLINIC | Age: 78
End: 2023-06-05
Payer: MEDICARE

## 2023-06-05 VITALS
RESPIRATION RATE: 16 BRPM | SYSTOLIC BLOOD PRESSURE: 126 MMHG | HEART RATE: 90 BPM | OXYGEN SATURATION: 97 % | WEIGHT: 168 LBS | BODY MASS INDEX: 31.72 KG/M2 | DIASTOLIC BLOOD PRESSURE: 80 MMHG | HEIGHT: 61 IN

## 2023-06-05 DIAGNOSIS — G89.29 CHRONIC BILATERAL LOW BACK PAIN WITHOUT SCIATICA: ICD-10-CM

## 2023-06-05 DIAGNOSIS — M54.50 CHRONIC BILATERAL LOW BACK PAIN WITHOUT SCIATICA: ICD-10-CM

## 2023-06-05 DIAGNOSIS — I10 PRIMARY HYPERTENSION: ICD-10-CM

## 2023-06-05 DIAGNOSIS — R41.0 INTERMITTENT CONFUSION: ICD-10-CM

## 2023-06-05 DIAGNOSIS — R19.7 DIARRHEA, UNSPECIFIED TYPE: Primary | ICD-10-CM

## 2023-06-05 LAB
Lab: NORMAL
QC PASS/FAIL: NORMAL
SARS-COV-2 RDRP RESP QL NAA+PROBE: NEGATIVE

## 2023-06-05 PROCEDURE — 96372 THER/PROPH/DIAG INJ SC/IM: CPT | Performed by: FAMILY MEDICINE

## 2023-06-05 PROCEDURE — 1123F ACP DISCUSS/DSCN MKR DOCD: CPT | Performed by: FAMILY MEDICINE

## 2023-06-05 PROCEDURE — G8400 PT W/DXA NO RESULTS DOC: HCPCS | Performed by: FAMILY MEDICINE

## 2023-06-05 PROCEDURE — 3074F SYST BP LT 130 MM HG: CPT | Performed by: FAMILY MEDICINE

## 2023-06-05 PROCEDURE — 3079F DIAST BP 80-89 MM HG: CPT | Performed by: FAMILY MEDICINE

## 2023-06-05 PROCEDURE — 99214 OFFICE O/P EST MOD 30 MIN: CPT | Performed by: FAMILY MEDICINE

## 2023-06-05 PROCEDURE — G8417 CALC BMI ABV UP PARAM F/U: HCPCS | Performed by: FAMILY MEDICINE

## 2023-06-05 PROCEDURE — 1036F TOBACCO NON-USER: CPT | Performed by: FAMILY MEDICINE

## 2023-06-05 PROCEDURE — 1090F PRES/ABSN URINE INCON ASSESS: CPT | Performed by: FAMILY MEDICINE

## 2023-06-05 PROCEDURE — 87635 SARS-COV-2 COVID-19 AMP PRB: CPT | Performed by: FAMILY MEDICINE

## 2023-06-05 PROCEDURE — G8427 DOCREV CUR MEDS BY ELIG CLIN: HCPCS | Performed by: FAMILY MEDICINE

## 2023-06-05 RX ORDER — KETOROLAC TROMETHAMINE 10 MG/1
10 TABLET, FILM COATED ORAL EVERY 6 HOURS PRN
Qty: 20 TABLET | Refills: 0 | Status: ON HOLD | OUTPATIENT
Start: 2023-06-05 | End: 2024-06-04

## 2023-06-05 RX ORDER — KETOROLAC TROMETHAMINE 30 MG/ML
60 INJECTION, SOLUTION INTRAMUSCULAR; INTRAVENOUS ONCE
Status: DISCONTINUED | OUTPATIENT
Start: 2023-06-05 | End: 2023-06-05

## 2023-06-05 RX ORDER — KETOROLAC TROMETHAMINE 30 MG/ML
30 INJECTION, SOLUTION INTRAMUSCULAR; INTRAVENOUS ONCE
Status: COMPLETED | OUTPATIENT
Start: 2023-06-05 | End: 2023-06-05

## 2023-06-05 RX ORDER — GABAPENTIN 400 MG/1
400-800 CAPSULE ORAL 3 TIMES DAILY
Qty: 120 CAPSULE | Refills: 0 | Status: ON HOLD | OUTPATIENT
Start: 2023-06-05 | End: 2023-07-05

## 2023-06-05 RX ADMIN — KETOROLAC TROMETHAMINE 30 MG: 30 INJECTION, SOLUTION INTRAMUSCULAR; INTRAVENOUS at 15:37

## 2023-06-05 ASSESSMENT — ENCOUNTER SYMPTOMS
BLOOD IN STOOL: 0
SHORTNESS OF BREATH: 0
COUGH: 0
VOMITING: 1
NAUSEA: 0
ANAL BLEEDING: 0
DIARRHEA: 1
BACK PAIN: 1
CONSTIPATION: 0
RECTAL PAIN: 0

## 2023-06-05 NOTE — PROGRESS NOTES
Medication(s) given during visit:    Administrations This Visit       ketorolac (TORADOL) injection 30 mg       Admin Date  06/05/2023  15:37 Action  Given Dose  30 mg Route  IntraMUSCular Site  Dorsogluteal Right Administered By  Monique Gandhi MA    Ordering Provider: Santiago Mariano MD    NDC: 24945-134-45    Lot#: [id number]    : [address] [place] [address]    Patient Supplied?: No                    Patient instructed to remain in clinic for 20 minutes after injection and was advised to report any adverse reaction to me immediately.
exposed to tobacco smoke. She has never used smokeless tobacco. She reports current alcohol use of about 1.0 standard drink per week. She reports that she does not use drugs. Medications    Current Outpatient Medications:     ketorolac (TORADOL) 10 MG tablet, Take 1 tablet by mouth every 6 hours as needed for Pain, Disp: 20 tablet, Rfl: 0    gabapentin (NEURONTIN) 400 MG capsule, Take 1-2 capsules by mouth 3 times daily for 30 days. Take one twice a day and two before bed, Disp: 120 capsule, Rfl: 0    aspirin 81 MG chewable tablet, Take 1 tablet by mouth daily, Disp: , Rfl:     albuterol (PROVENTIL) (2.5 MG/3ML) 0.083% nebulizer solution, USE 1 VIAL IN NEBULIZER 4 TIMES DAILY AS NEEDED FOR SHORTNESS OF BREATH WHEEZING, Disp: , Rfl:     calcium carbonate 600 MG TABS tablet, Take 1 tablet by mouth daily, Disp: , Rfl:     acetaminophen (TYLENOL) 325 MG tablet, Take 4 tablets by mouth every 6 hours as needed for Pain, Disp: , Rfl:     famotidine (PEPCID) 20 MG tablet, Take 1 tablet by mouth 2 times daily, Disp: , Rfl:     nitroGLYCERIN (NITROSTAT) 0.4 MG SL tablet, Place 1 tablet under the tongue every 5 minutes as needed for Chest pain up to max of 3 total doses. If no relief after 1 dose, call 911., Disp: , Rfl:     atorvastatin (LIPITOR) 40 MG tablet, Take 1 tablet by mouth daily, Disp: , Rfl:     vitamin D (CHOLECALCIFEROL) 25 MCG (1000 UT) TABS tablet, Take 1 tablet by mouth daily, Disp: , Rfl:     amitriptyline (ELAVIL) 10 MG tablet, Take 1 tablet by mouth nightly, Disp: , Rfl:     traMADol (ULTRAM) 50 MG tablet, Take 1 tablet by mouth every 6 hours as needed for Pain., Disp: , Rfl:     fosinopril (MONOPRIL) 40 MG tablet, Take 1 tablet by mouth daily (Patient not taking: Reported on 6/5/2023), Disp: , Rfl:     HYDROcodone-acetaminophen (NORCO) 5-325 MG per tablet, Take 1 tablet by mouth every 6 hours as needed for Pain.  (Patient not taking: Reported on 6/5/2023), Disp: , Rfl:     Subjective:      Review of

## 2023-06-06 LAB
BILIRUBIN, POC: NORMAL
BLOOD URINE, POC: NORMAL
CLARITY, POC: NORMAL
COLOR, POC: YELLOW
GLUCOSE URINE, POC: NORMAL
KETONES, POC: NORMAL
LEUKOCYTE EST, POC: NORMAL
NITRITE, POC: POSITIVE
PH, POC: 6
PROTEIN, POC: NORMAL
SPECIFIC GRAVITY, POC: 1.01
UROBILINOGEN, POC: 0.2

## 2023-06-06 RX ORDER — BACLOFEN 10 MG/1
TABLET ORAL
Qty: 45 TABLET | Refills: 0 | OUTPATIENT
Start: 2023-06-06

## 2023-06-06 NOTE — TELEPHONE ENCOUNTER
Spoke with patient and stated they were in the process of picking up her prescriptions from [facility]. No further concerns.

## 2023-06-08 ENCOUNTER — TELEPHONE (OUTPATIENT)
Dept: FAMILY MEDICINE CLINIC | Age: 78
End: 2023-06-08

## 2023-06-08 ENCOUNTER — HOSPITAL ENCOUNTER (OUTPATIENT)
Dept: MRI IMAGING | Age: 78
Discharge: HOME | End: 2023-06-08
Attending: FAMILY MEDICINE
Payer: MEDICARE

## 2023-06-08 DIAGNOSIS — G89.29 CHRONIC BILATERAL LOW BACK PAIN WITHOUT SCIATICA: Primary | ICD-10-CM

## 2023-06-08 DIAGNOSIS — M54.50 CHRONIC BILATERAL LOW BACK PAIN WITHOUT SCIATICA: Primary | ICD-10-CM

## 2023-06-08 DIAGNOSIS — M54.50 CHRONIC BILATERAL LOW BACK PAIN WITHOUT SCIATICA: ICD-10-CM

## 2023-06-08 DIAGNOSIS — R41.0 INTERMITTENT CONFUSION: ICD-10-CM

## 2023-06-08 DIAGNOSIS — G89.29 CHRONIC BILATERAL LOW BACK PAIN WITHOUT SCIATICA: ICD-10-CM

## 2023-06-08 LAB
BACTERIA UR CULT: ABNORMAL
ORGANISM: ABNORMAL

## 2023-06-08 PROCEDURE — A9579 GAD-BASE MR CONTRAST NOS,1ML: HCPCS | Performed by: FAMILY MEDICINE

## 2023-06-08 PROCEDURE — 6360000004 HC RX CONTRAST MEDICATION: Performed by: FAMILY MEDICINE

## 2023-06-08 PROCEDURE — 70553 MRI BRAIN STEM W/O & W/DYE: CPT

## 2023-06-08 PROCEDURE — 72148 MRI LUMBAR SPINE W/O DYE: CPT

## 2023-06-08 RX ADMIN — GADOTERIDOL 15 ML: 279.3 INJECTION, SOLUTION INTRAVENOUS at 17:27

## 2023-06-08 NOTE — TELEPHONE ENCOUNTER
Please swap to stat. I cannot change priority under ordering. Let me know if new order is needed. Thanks. If she is having weakness, numbness, leakage of urine or stool, trouble feeling the toilet paper when wiping, fevers, or other severe symptoms you should be seen right away.

## 2023-06-08 NOTE — TELEPHONE ENCOUNTER
Patients daughter Andrae Pedro is calling and stating that the patient is getting worse- unable to get in to have the MRI done until June 22- is this something you can change to STAT to get patient scheduled sooner?      Please advise     Alexandre Pedro [phone]

## 2023-06-08 NOTE — TELEPHONE ENCOUNTER
Previous orders were STAT - called central scheduling- Vannesa states that MRI scheduling states that MRI are not STAT    Called MRI again- spoke with Karlo Miles- only 1 MRI machine tomorrow - so can not do tomorrow    He will do today at 4 pm - arrive at 330     STAT Creatinine ordered-     Edna Quijano notified and voiced understanding- no additional questions at this time

## 2023-06-09 PROBLEM — R07.89 ATYPICAL CHEST PAIN: Status: ACTIVE | Noted: 2022-07-07

## 2023-06-09 PROBLEM — K21.9 GASTROESOPHAGEAL REFLUX DISEASE: Status: ACTIVE | Noted: 2023-06-09

## 2023-06-09 PROBLEM — M48.56XA COMPRESSION FRACTURE OF LUMBAR SPINE, NON-TRAUMATIC, INITIAL ENCOUNTER (HCC): Status: ACTIVE | Noted: 2023-06-09

## 2023-06-12 PROBLEM — M54.59 INTRACTABLE LOW BACK PAIN: Status: ACTIVE | Noted: 2023-06-12

## 2023-06-12 PROBLEM — M54.16 LUMBAR RADICULITIS: Status: ACTIVE | Noted: 2023-06-12

## 2023-06-12 PROBLEM — M48.061 SPINAL STENOSIS OF LUMBAR REGION WITHOUT NEUROGENIC CLAUDICATION: Status: ACTIVE | Noted: 2023-06-12

## 2023-06-13 PROBLEM — M80.00XA AGE-RELATED OSTEOPOROSIS WITH CURRENT PATHOLOGICAL FRACTURE: Status: ACTIVE | Noted: 2023-06-13

## 2023-06-13 PROBLEM — M48.062 SPINAL STENOSIS OF LUMBAR REGION WITH NEUROGENIC CLAUDICATION: Status: ACTIVE | Noted: 2023-06-12

## 2023-06-13 PROBLEM — M54.9 INTRACTABLE BACK PAIN: Status: ACTIVE | Noted: 2023-06-13

## 2023-06-19 ENCOUNTER — TELEPHONE (OUTPATIENT)
Dept: FAMILY MEDICINE CLINIC | Age: 78
End: 2023-06-19

## 2023-06-19 DIAGNOSIS — S32.040D COMPRESSION FRACTURE OF L4 VERTEBRA WITH ROUTINE HEALING, SUBSEQUENT ENCOUNTER: Primary | ICD-10-CM

## 2023-06-19 NOTE — TELEPHONE ENCOUNTER
[facility] [employer] called and is wanting to know if you would do an order for PT/OT. Pt was in the hospital and the resident did an order for it but they can not except a resident signature. Call back number is [phone] Ext [phone] or fax order to [phone]. Pt does have hospital follow up on 6/22/23.

## 2023-06-19 NOTE — TELEPHONE ENCOUNTER
Referral faxed to [facility], [facility]

## 2023-06-22 ENCOUNTER — OFFICE VISIT (OUTPATIENT)
Dept: FAMILY MEDICINE CLINIC | Age: 78
End: 2023-06-22

## 2023-06-22 VITALS
BODY MASS INDEX: 31.38 KG/M2 | WEIGHT: 166.2 LBS | RESPIRATION RATE: 17 BRPM | HEART RATE: 91 BPM | SYSTOLIC BLOOD PRESSURE: 134 MMHG | DIASTOLIC BLOOD PRESSURE: 88 MMHG | OXYGEN SATURATION: 99 % | HEIGHT: 61 IN

## 2023-06-22 DIAGNOSIS — E83.42 HYPOMAGNESEMIA: ICD-10-CM

## 2023-06-22 DIAGNOSIS — M80.00XD AGE-RELATED OSTEOPOROSIS WITH CURRENT PATHOLOGICAL FRACTURE WITH ROUTINE HEALING, SUBSEQUENT ENCOUNTER: ICD-10-CM

## 2023-06-22 DIAGNOSIS — E87.20 ACIDOSIS: ICD-10-CM

## 2023-06-22 DIAGNOSIS — I10 PRIMARY HYPERTENSION: ICD-10-CM

## 2023-06-22 DIAGNOSIS — S32.040D COMPRESSION FRACTURE OF L4 VERTEBRA WITH ROUTINE HEALING, SUBSEQUENT ENCOUNTER: ICD-10-CM

## 2023-06-22 DIAGNOSIS — Z09 HOSPITAL DISCHARGE FOLLOW-UP: Primary | ICD-10-CM

## 2023-06-22 PROBLEM — M81.0 OSTEOPOROSIS: Status: ACTIVE | Noted: 2023-06-13

## 2023-06-22 LAB
ANION GAP SERPL CALC-SCNC: 12 MEQ/L (ref 8–16)
BUN SERPL-MCNC: 12 MG/DL (ref 7–22)
CALCIUM SERPL-MCNC: 9.6 MG/DL (ref 8.5–10.5)
CHLORIDE SERPL-SCNC: 103 MEQ/L (ref 98–111)
CO2 SERPL-SCNC: 22 MEQ/L (ref 23–33)
CREAT SERPL-MCNC: 0.9 MG/DL (ref 0.4–1.2)
GFR SERPL CREATININE-BSD FRML MDRD: > 60 ML/MIN/1.73M2
GLUCOSE SERPL-MCNC: 88 MG/DL (ref 70–108)
MAGNESIUM SERPL-MCNC: 1.6 MG/DL (ref 1.6–2.4)
POTASSIUM SERPL-SCNC: 4.2 MEQ/L (ref 3.5–5.2)
SODIUM SERPL-SCNC: 137 MEQ/L (ref 135–145)

## 2023-06-22 RX ORDER — OXYCODONE HYDROCHLORIDE AND ACETAMINOPHEN 5; 325 MG/1; MG/1
1 TABLET ORAL EVERY 8 HOURS PRN
COMMUNITY

## 2023-06-22 ASSESSMENT — ENCOUNTER SYMPTOMS
SHORTNESS OF BREATH: 0
VOMITING: 0
EYE DISCHARGE: 0
NAUSEA: 0
COUGH: 0
DIARRHEA: 0
ABDOMINAL PAIN: 0
EYE REDNESS: 0
CONSTIPATION: 0
BACK PAIN: 1
SORE THROAT: 0

## 2023-06-22 NOTE — PROGRESS NOTES
Post-Discharge Transitional Care  Follow Up      Ella Wagner   YOB: 1945    Date of Office Visit:  6/22/2023  Date of Hospital Admission: 6/9/23  Date of Hospital Discharge: 6/14/23  Risk of hospital readmission (high >=14%. Medium >=10%) :Readmission Risk Score: 11.6      Care management risk score Rising risk (score 2-5) and Complex Care (Scores >=6): No Risk Score On File     Non face to face  following discharge, date last encounter closed (first attempt may have been earlier): 06/15/2023    Call initiated 2 business days of discharge: Yes    ASSESSMENT/PLAN:   Hospital discharge follow-up  Follow-up. -     OR DISCHARGE MEDS RECONCILED W/ CURRENT OUTPATIENT MED LIST  Compression fracture of L4 vertebra with routine healing, subsequent encounter  Recovering well post surgery. Continue therapy. Hypomagnesemia  Level still in range. Acidosis  Resolved. Age-related osteoporosis with current pathological fracture with routine healing, subsequent encounter  Continue treatment. Primary hypertension  At goal on fewer medications. Medical Decision Making: moderate complexity  Return in 1 month (on 7/22/2023). Subjective:   HPI:  Follow up of Hospital problems/diagnosis(es): Back surgery. Inpatient course: Discharge summary reviewed- see chart. Interval history/Current status: Did not qualify for list chair. They would pay for mechanism but not chair. Daughter ended up buy one out of pocket for $550 but got a discount. Pain is much better. Not taking Toradol. Closer to her normal.  Usually just taking tylenol. Occasional Percocet from PCP. Walking with walker. Has PT an OT. Stopped amitriptyline and blood pressure medication in hospital.  Doing well off. Is back on pain medication from home physician. Uses this intermittently. Urine symptoms are gone. Had low potassium and magnesium on initial presentation.       Reports on aspirin

## 2023-06-26 ENCOUNTER — OFFICE VISIT (OUTPATIENT)
Dept: PHYSICAL MEDICINE AND REHAB | Age: 78
End: 2023-06-26
Payer: MEDICARE

## 2023-06-26 VITALS
BODY MASS INDEX: 31.34 KG/M2 | DIASTOLIC BLOOD PRESSURE: 80 MMHG | HEIGHT: 61 IN | SYSTOLIC BLOOD PRESSURE: 126 MMHG | WEIGHT: 166 LBS

## 2023-06-26 DIAGNOSIS — M48.062 SPINAL STENOSIS OF LUMBAR REGION WITH NEUROGENIC CLAUDICATION: ICD-10-CM

## 2023-06-26 DIAGNOSIS — M80.00XG AGE-RELATED OSTEOPOROSIS WITH CURRENT PATHOLOGICAL FRACTURE WITH DELAYED HEALING, SUBSEQUENT ENCOUNTER: ICD-10-CM

## 2023-06-26 DIAGNOSIS — M54.16 LUMBAR RADICULOPATHY: Primary | ICD-10-CM

## 2023-06-26 DIAGNOSIS — G89.4 CHRONIC PAIN SYNDROME: ICD-10-CM

## 2023-06-26 DIAGNOSIS — Z98.890 S/P KYPHOPLASTY: ICD-10-CM

## 2023-06-26 DIAGNOSIS — M47.816 LUMBAR SPONDYLOSIS: ICD-10-CM

## 2023-06-26 DIAGNOSIS — S32.040S CLOSED COMPRESSION FRACTURE OF L4 VERTEBRA, SEQUELA: ICD-10-CM

## 2023-06-26 PROCEDURE — 1123F ACP DISCUSS/DSCN MKR DOCD: CPT | Performed by: NURSE PRACTITIONER

## 2023-06-26 PROCEDURE — 99214 OFFICE O/P EST MOD 30 MIN: CPT | Performed by: NURSE PRACTITIONER

## 2023-06-26 ASSESSMENT — ENCOUNTER SYMPTOMS
GASTROINTESTINAL NEGATIVE: 1
RESPIRATORY NEGATIVE: 1
BACK PAIN: 1

## 2023-08-21 ENCOUNTER — TELEPHONE (OUTPATIENT)
Dept: PHYSICAL MEDICINE AND REHAB | Age: 78
End: 2023-08-21

## 2023-08-21 NOTE — TELEPHONE ENCOUNTER
Received fax from [facility] with consent/release to fax last office notes to them. Completed.

## 2023-09-20 ENCOUNTER — TELEPHONE (OUTPATIENT)
Dept: FAMILY MEDICINE CLINIC | Age: 78
End: 2023-09-20

## 2023-09-20 NOTE — TELEPHONE ENCOUNTER
----- Message from Jarrell sent at 9/20/2023  2:30 PM EDT -----  Subject: Message to Provider    QUESTIONS  Information for Provider? Patient was visiting the area in June when she   saw Shanelle Jimenez for pain in her back and couldn't walk . Shanelle Jimenez ordered an MRI and then sent the patient to the hospital because   she broke her L-4. Patient's insurance is denying all the claims because   they didn't think her visits to office and hospital were an emergency. Patient needs a paper from Shanelle Cookevelia stating her appts were an   emergency so she can send to her insurance company so they will pay for   the visits to hospital and office. ---------------------------------------------------------------------------  --------------  Becky Harris [facility]  [phone]; OK to leave message on voicemail  ---------------------------------------------------------------------------  --------------  SCRIPT ANSWERS  Relationship to Patient?  Self

## 2023-09-21 ENCOUNTER — TELEPHONE (OUTPATIENT)
Dept: FAMILY MEDICINE CLINIC | Age: 78
End: 2023-09-21

## 2023-09-21 NOTE — TELEPHONE ENCOUNTER
Letter written. See communications. Please call patient and send letter where it needs to go. Thanks.

## (undated) DEVICE — MERCY DEFIANCE ENDO KIT: Brand: MEDLINE INDUSTRIES, INC.

## (undated) DEVICE — FORCEPS BX L240CM JAW DIA2.4MM ORNG L CAP W/ NDL DISP RAD

## (undated) DEVICE — CONNECTOR TBNG AUX H2O JET DISP FOR OLY 160/180 SER

## (undated) DEVICE — 1200CC GUARDIAN II: Brand: GUARDIAN

## (undated) DEVICE — ESOPHAGEAL BALLOON DILATATION CATHETER: Brand: CRE FIXED WIRE

## (undated) DEVICE — LINE SAMP O2 6.5FT W/FEMALE CONN F/ADULT CAPNOLINE PLUS

## (undated) DEVICE — BITE BLOCK W/VELCRO STRAP